# Patient Record
Sex: FEMALE | Race: WHITE | NOT HISPANIC OR LATINO | Employment: FULL TIME | URBAN - METROPOLITAN AREA
[De-identification: names, ages, dates, MRNs, and addresses within clinical notes are randomized per-mention and may not be internally consistent; named-entity substitution may affect disease eponyms.]

---

## 2017-01-25 ENCOUNTER — OFFICE VISIT (OUTPATIENT)
Dept: FAMILY MEDICINE | Facility: CLINIC | Age: 57
End: 2017-01-25
Payer: COMMERCIAL

## 2017-01-25 ENCOUNTER — HOSPITAL ENCOUNTER (OUTPATIENT)
Dept: RADIOLOGY | Facility: HOSPITAL | Age: 57
Discharge: HOME | End: 2017-01-25
Attending: FAMILY MEDICINE
Payer: COMMERCIAL

## 2017-01-25 ENCOUNTER — TELEPHONE (OUTPATIENT)
Dept: FAMILY MEDICINE | Facility: CLINIC | Age: 57
End: 2017-01-25

## 2017-01-25 VITALS
WEIGHT: 131.63 LBS | BODY MASS INDEX: 21.93 KG/M2 | HEART RATE: 79 BPM | DIASTOLIC BLOOD PRESSURE: 66 MMHG | SYSTOLIC BLOOD PRESSURE: 101 MMHG | TEMPERATURE: 98 F | HEIGHT: 65 IN

## 2017-01-25 DIAGNOSIS — R14.0 ABDOMINAL BLOATING: ICD-10-CM

## 2017-01-25 DIAGNOSIS — R10.13 ABDOMINAL PAIN, ACUTE, EPIGASTRIC: Primary | ICD-10-CM

## 2017-01-25 DIAGNOSIS — R10.13 EPIGASTRIC PAIN: Primary | ICD-10-CM

## 2017-01-25 DIAGNOSIS — R10.13 ABDOMINAL PAIN, ACUTE, EPIGASTRIC: ICD-10-CM

## 2017-01-25 PROCEDURE — 74176 CT ABD & PELVIS W/O CONTRAST: CPT | Mod: TC

## 2017-01-25 PROCEDURE — 99999 PR PBB SHADOW E&M-EST. PATIENT-LVL III: CPT | Mod: PBBFAC,,, | Performed by: PHYSICIAN ASSISTANT

## 2017-01-25 PROCEDURE — 25500020 PHARM REV CODE 255: Performed by: FAMILY MEDICINE

## 2017-01-25 PROCEDURE — 1159F MED LIST DOCD IN RCRD: CPT | Mod: S$GLB,,, | Performed by: PHYSICIAN ASSISTANT

## 2017-01-25 PROCEDURE — 74176 CT ABD & PELVIS W/O CONTRAST: CPT | Mod: 26,,, | Performed by: RADIOLOGY

## 2017-01-25 PROCEDURE — 99214 OFFICE O/P EST MOD 30 MIN: CPT | Mod: S$GLB,,, | Performed by: PHYSICIAN ASSISTANT

## 2017-01-25 RX ORDER — ESOMEPRAZOLE MAGNESIUM 40 MG/1
40 CAPSULE, DELAYED RELEASE ORAL
Qty: 30 CAPSULE | Refills: 2 | Status: SHIPPED | OUTPATIENT
Start: 2017-01-25 | End: 2017-03-02

## 2017-01-25 RX ADMIN — IOHEXOL 30 ML: 350 INJECTION, SOLUTION INTRAVENOUS at 11:01

## 2017-01-25 NOTE — PROGRESS NOTES
Subjective:       Patient ID: Joselyn Hurd is a 56 y.o. female.    Chief Complaint: Bloated (x 3 day) and Abdominal Pain (under the breast bone )    Abdominal Pain   This is a new problem. The current episode started in the past 7 days (2 days ago). The onset quality is undetermined. The problem occurs constantly. The problem has been gradually worsening. The pain is located in the epigastric region. The pain is at a severity of 8/10. The quality of the pain is burning. The abdominal pain radiates to the periumbilical region. Associated symptoms include nausea. Pertinent negatives include no anorexia, belching, constipation, diarrhea, dysuria, fever, frequency, hematochezia, hematuria, melena or vomiting. Nothing aggravates the pain. The pain is relieved by nothing. She has tried antacids for the symptoms. The treatment provided no relief. Her past medical history is significant for GERD and irritable bowel syndrome.     Review of Systems   Constitutional: Negative for appetite change, chills, diaphoresis, fatigue, fever and unexpected weight change.   Gastrointestinal: Positive for abdominal distention, abdominal pain and nausea. Negative for anal bleeding, anorexia, blood in stool, constipation, diarrhea, hematochezia, melena and vomiting.   Genitourinary: Negative for difficulty urinating, dysuria, flank pain, frequency and hematuria.   Musculoskeletal: Negative for back pain.   Skin: Negative for rash.   Neurological: Negative for dizziness and light-headedness.       Objective:      Physical Exam   Constitutional: She appears well-developed and well-nourished. She is cooperative. She appears distressed (holding abdomen ).   HENT:   Head: Normocephalic and atraumatic.   Cardiovascular: Normal rate, regular rhythm and normal heart sounds.    Pulmonary/Chest: Effort normal and breath sounds normal.   Abdominal: She exhibits distension. Bowel sounds are decreased. There is tenderness in the right upper quadrant,  right lower quadrant and epigastric area. There is guarding. There is no rigidity, no rebound, no CVA tenderness, no tenderness at McBurney's point and negative Ramos's sign.   Neurological: She is alert.   Vitals reviewed.      Assessment:       1. Abdominal pain, acute, epigastric    2. Abdominal bloating        Plan:       Joselyn was seen today for bloated and abdominal pain.    Diagnoses and all orders for this visit:    Abdominal pain, acute, epigastric  -   Abdominal bloating  -     STAT  CT Abdomen Pelvis  Without Contrast; Future  -     CBC auto differential; Future  -     Comprehensive metabolic panel; Future  -     Amylase; Future  -     Lipase; Future  Further recommendations will be made based on results     If CT and lab normal start PPI, upper gi and gi consult

## 2017-01-25 NOTE — TELEPHONE ENCOUNTER
Patient notified of results; voices understanding; established already with GI; will call , gastroenterologist for followup

## 2017-01-25 NOTE — TELEPHONE ENCOUNTER
Labs and CT show no acute abnormaltiy to account for her symptoms  There is constipation noted on CT   I would like to start a medication to reduce acid - rx sent into pharmacy and have her follow up with GI     Also recommend Miralax for constipation

## 2017-01-25 NOTE — TELEPHONE ENCOUNTER
----- Message from Jayshree Balbuena sent at 1/25/2017  3:07 PM CST -----  Contact: self  Patient is calling for LAB, Ct Scan results. Please call patient at [phone]. Thanks!

## 2017-01-25 NOTE — MR AVS SNAPSHOT
[facility]  [address] [place] [address]  Phone: [phone]  Fax: [phone]                  Joselyn Hurd   2017 8:00 AM   Office Visit    Description:  Female : 1960   Provider:  ELIZ Valladares   Department:  [facility]           Reason for Visit     Bloated     Abdominal Pain           Diagnoses this Visit        Comments    Abdominal pain, acute, epigastric    -  Primary     Abdominal bloating                To Do List           Future Appointments        Provider Department Dept Phone    2017 9:30 AM LAB, [facility] [phone]    2017 1:30 PM [facility] CT1 LIMIT 400 LBS [facility] [phone]    3/2/2017 3:40 PM Sebastián Thacker MD [facility] [phone]      Goals (5 Years of Data)     None      [facility]     [facility] Nurse Care Line -  Assistance  Registered nurses in the [facility] provide clinical advisement, health education, appointment booking, and other advisory services.  Call for this free service at [phone].             Medications           Message regarding Medications     Verify the changes and/or additions to your medication regime listed below are the same as discussed with your clinician today.  If any of these changes or additions are incorrect, please notify your healthcare provider.        STOP taking these medications     vitamin D (VITAMIN D3) 1000 units Tab Take 185 mg by mouth once daily.           Verify that the below list of medications is an accurate representation of the medications you are currently taking.  If none reported, the list may be blank. If incorrect, please contact your healthcare provider. Carry this list with you in case of emergency.           Current Medications     aspirin (ECOTRIN) 81 MG EC tablet Take 81 mg by mouth once daily.    ESTRING 2 mg vaginal ring Place vaginally once.   "   fluticasone (FLONASE) 50 mcg/actuation nasal spray 2 sprays by Each Nare route once daily.    multivitamin (THERAGRAN) per tablet Take 1 tablet by mouth daily as needed.     rizatriptan (MAXALT) 10 MG tablet TAKE 1 TABLET BY MOUTH AS NEEDED FOR MIGRAINE    trazodone (DESYREL) 50 MG tablet Take 1 tablet (50 mg total) by mouth every evening.           Clinical Reference Information           Vital Signs - Last Recorded  Most recent update: 1/25/2017  8:36 AM by Shaniqua Ramirez MA    BP Pulse Temp Ht Wt BMI    101/66 (BP Location: Left arm, Patient Position: Sitting, BP Method: Automatic) 79 97.8 °F (36.6 °C) (Oral) 5' 5" (1.651 m) 59.7 kg (131 lb 9.8 oz) 21.9 kg/m2      Blood Pressure          Most Recent Value    BP  101/66      Allergies as of 1/25/2017     Phenytoin Sodium Extended    Valacyclovir      Immunizations Administered on Date of Encounter - 1/25/2017     None      Orders Placed During Today's Visit     Future Labs/Procedures Expected by Expires    Amylase  1/25/2017 3/26/2018    CBC auto differential  1/25/2017 1/25/2018    Comprehensive metabolic panel  1/25/2017 3/26/2018    CT Abdomen Pelvis  Without Contrast  1/25/2017 1/25/2018    Lipase  1/25/2017 3/26/2018      [facility] Sign-Up     Activating your [facility] account is as easy as 1-2-3!     1) Visit [email or web address], select Sign Up Now, enter this activation code and your date of birth, then select Next.  [email or web address]  Expires: 3/11/2017  8:41 AM      2) Create a username and password to use when you visit [facility] in the future and select a security question in case you lose your password and select Next.    3) Enter your e-mail address and click Sign Up!    Additional Information  If you have questions, please e-mail [email or web address] or call [phone] to talk to our [facility] staff. Remember, [facility] is NOT to be used for urgent needs. For medical emergencies, dial 911.         "

## 2017-02-20 RX ORDER — RIZATRIPTAN BENZOATE 10 MG/1
TABLET ORAL
Qty: 9 TABLET | Refills: 2 | Status: SHIPPED | OUTPATIENT
Start: 2017-02-20 | End: 2017-05-01 | Stop reason: SDUPTHER

## 2017-02-20 RX ORDER — RIZATRIPTAN BENZOATE 10 MG/1
TABLET ORAL
Qty: 9 TABLET | Refills: 2 | Status: SHIPPED | OUTPATIENT
Start: 2017-02-20 | End: 2017-02-20 | Stop reason: SDUPTHER

## 2017-02-20 NOTE — TELEPHONE ENCOUNTER
----- Message from Madhuri Omalley LPN sent at 2/20/2017  1:33 PM CST -----  Contact: Iriada      ----- Message -----     From: Peace Cardoso     Sent: 2/20/2017  12:45 PM       To: Roya Gilliam Staff    Needs clarification on directions for Maxalt.  Please call back at .

## 2017-02-20 NOTE — TELEPHONE ENCOUNTER
----- Message from Lynette Rivera sent at 2/18/2017 11:01 AM CST -----  Contact: self [phone]  Patient is requesting a call back from the nurse stated her migraine medication for rizatriptan need to be re approved.  [facility] [address].    Please call the patient upon request at phone number [phone].

## 2017-02-21 ENCOUNTER — DOCUMENTATION ONLY (OUTPATIENT)
Dept: FAMILY MEDICINE | Facility: CLINIC | Age: 57
End: 2017-02-21

## 2017-02-21 NOTE — PROGRESS NOTES
Pre-Visit Chart Review  For Appointment Scheduled on 3-2-17    Health Maintenance Due   Topic Date Due    TETANUS VACCINE  12/18/1979    Colonoscopy  10/14/2016

## 2017-03-02 ENCOUNTER — OFFICE VISIT (OUTPATIENT)
Dept: FAMILY MEDICINE | Facility: CLINIC | Age: 57
End: 2017-03-02
Payer: COMMERCIAL

## 2017-03-02 VITALS
HEIGHT: 65 IN | RESPIRATION RATE: 16 BRPM | TEMPERATURE: 98 F | BODY MASS INDEX: 22.63 KG/M2 | DIASTOLIC BLOOD PRESSURE: 64 MMHG | SYSTOLIC BLOOD PRESSURE: 100 MMHG | HEART RATE: 86 BPM | WEIGHT: 135.81 LBS | OXYGEN SATURATION: 98 %

## 2017-03-02 DIAGNOSIS — G43.009 NONINTRACTABLE MIGRAINE, UNSPECIFIED MIGRAINE TYPE: ICD-10-CM

## 2017-03-02 DIAGNOSIS — Z00.00 ANNUAL PHYSICAL EXAM: Primary | ICD-10-CM

## 2017-03-02 DIAGNOSIS — R10.13 EPIGASTRIC PAIN: ICD-10-CM

## 2017-03-02 PROCEDURE — 99999 PR PBB SHADOW E&M-EST. PATIENT-LVL III: CPT | Mod: PBBFAC,,, | Performed by: FAMILY MEDICINE

## 2017-03-02 PROCEDURE — 99396 PREV VISIT EST AGE 40-64: CPT | Mod: S$GLB,,, | Performed by: FAMILY MEDICINE

## 2017-03-02 RX ORDER — DOCUSATE SODIUM 100 MG/1
100 CAPSULE, LIQUID FILLED ORAL DAILY PRN
COMMUNITY

## 2017-03-02 RX ORDER — UBIDECARENONE 75 MG
500 CAPSULE ORAL DAILY
COMMUNITY

## 2017-03-02 NOTE — PROGRESS NOTES
Subjective:       Patient ID: Joselyn Hurd is a 56 y.o. female.    Chief Complaint: Annual Exam    HPI Comments: 56 year old female in for annual exam.  She was seen a month ago for epigastric/RUQ abdominal pain worse with eating and not relieved by antacids.  It did not radiate and was not associated with activity.  Workup was negative including CT abdomen and lab and it has gradually subsided and is asymptomatic now.  She otherwise feels well with occasional migraines.      Past Medical History:  No date: GERD (gastroesophageal reflux disease)  No date: IBS (irritable bowel syndrome)  No date: Migraine headache    Past Surgical History:  10/14/11: COLONOSCOPY      Comment: Dr Ch 2 polyps removed, 5 year recheck  11/09/2016: COLONOSCOPY      Comment: Dr Ch 5 year swati   No date: DILATION AND CURETTAGE OF UTERUS  No date: TONSILLECTOMY  No date: WISDOM TOOTH EXTRACTION    Review of patient's family history indicates:    Hypertension                   Mother                    Kidney disease                 Mother                    Colon polyps                   Mother                    Heart disease                  Father                    Colon cancer                   Father                    Cancer                         Father                      Comment: colon/ leukemia     Hearing loss                   Father                    Cataracts                      Father                    Glaucoma                       Father                    Vision loss                    Father                    Kidney disease                 Father                    Hypertension                   Sister                    ADARSH disease                    Sister                    Asthma                         Sister                    Heart disease                  Sister                    Amblyopia                      Daughter                  Vitiligo                       Daughter                   Depression                     Daughter                  Hypertension                   Maternal Aunt             Stroke                         Maternal Aunt             Heart disease                  Maternal Uncle            Heart disease                  Maternal Grandmother      Dementia                       Maternal Grandmother      Alzheimer's disease            Maternal Grandmother      Cancer                         Maternal Grandfather        Comment: lung    Diabetes                       Brother                   No Known Problems              Paternal Grandmother      Cancer                         Paternal Grandfather      Heart disease                  Paternal Uncle            Cancer                         Maternal Aunt               Comment: lung, smoker    Social History    Marital status:              Spouse name:                       Years of education:                 Number of children:               Social History Main Topics    Smoking status: Never Smoker                                                                Smokeless status: Never Used                        Alcohol use: No              Drug use: No                Current Outpatient Prescriptions:     aspirin (ECOTRIN) 81 MG EC tablet, Take 81 mg by mouth once daily., Disp: , Rfl:     cyanocobalamin 500 MCG tablet, Take 500 mcg by mouth once daily. Vitamin D, Disp: , Rfl:     docusate sodium (COLACE) 100 MG capsule, Take 100 mg by mouth daily as needed for Constipation., Disp: , Rfl:     ESTRING 2 mg vaginal ring, Place vaginally once. , Disp: , Rfl:     fluticasone (FLONASE) 50 mcg/actuation nasal spray, 2 sprays by Each Nare route once daily., Disp: 16 g, Rfl: 11    multivitamin (THERAGRAN) per tablet, Take 1 tablet by mouth once daily. , Disp: , Rfl:     rizatriptan (MAXALT) 10 MG tablet, TAKE 1 TABLET BY MOUTH AS NEEDED FOR MIGRAINE, may repeat once in 24 hours, Disp: 9 tablet, Rfl: 2    trazodone (DESYREL) 50  MG tablet, Take 1 tablet (50 mg total) by mouth every evening. (Patient taking differently: Take 50 mg by mouth nightly as needed. ), Disp: 30 tablet, Rfl: 11    TETANUS VACCINE due on 12/18/1979-DECLINES      Review of Systems   Constitutional: Negative for chills, diaphoresis, fatigue, fever and unexpected weight change.   HENT: Negative for congestion, ear pain, hearing loss, postnasal drip, sinus pressure, sneezing, sore throat, tinnitus and trouble swallowing.    Eyes: Negative for itching and visual disturbance.   Respiratory: Negative for cough, chest tightness, shortness of breath and wheezing.    Cardiovascular: Negative for chest pain, palpitations and leg swelling.   Gastrointestinal: Positive for abdominal pain. Negative for blood in stool, constipation, diarrhea, nausea and vomiting.   Genitourinary: Negative for dysuria, frequency, hematuria, menstrual problem, pelvic pain, vaginal bleeding and vaginal discharge.   Musculoskeletal: Negative for arthralgias, back pain, joint swelling and myalgias.   Neurological: Negative for dizziness and headaches.   Hematological: Negative for adenopathy.   Psychiatric/Behavioral: Negative for sleep disturbance. The patient is not nervous/anxious.        Objective:      Physical Exam   Constitutional: She is oriented to person, place, and time. She appears well-developed and well-nourished. No distress.   Good blood pressure control  Patient is normal weight with bmi of 22.6.   Weight is increased by 3.9lb since last visit of 2/18/16.     HENT:   Head: Normocephalic and atraumatic.   Right Ear: External ear normal.   Left Ear: External ear normal.   Nose: Nose normal.   Mouth/Throat: Oropharynx is clear and moist. No oropharyngeal exudate.   Eyes: Conjunctivae and EOM are normal. Pupils are equal, round, and reactive to light. Right eye exhibits no discharge. Left eye exhibits no discharge. No scleral icterus.   Neck: Normal range of motion. Neck supple. No JVD  present. No tracheal deviation present. No thyromegaly present.   Cardiovascular: Normal rate, regular rhythm and normal heart sounds.  Exam reveals no gallop and no friction rub.    No murmur heard.  Pulmonary/Chest: Effort normal and breath sounds normal. No respiratory distress. She has no wheezes. She has no rales. She exhibits no tenderness.   Abdominal: Soft. Bowel sounds are normal. She exhibits no distension and no mass. There is no hepatosplenomegaly. There is no tenderness. There is no rebound, no guarding, no tenderness at McBurney's point and negative Ramos's sign. No hernia.   Musculoskeletal: Normal range of motion. She exhibits no edema, tenderness or deformity.   Lymphadenopathy:     She has no cervical adenopathy.   Neurological: She is alert and oriented to person, place, and time. She has normal reflexes. She displays normal reflexes. No cranial nerve deficit. She exhibits normal muscle tone.   Skin: Skin is warm and dry. No rash noted. She is not diaphoretic. No erythema.   Psychiatric: She has a normal mood and affect. Her behavior is normal. Judgment and thought content normal.   Nursing note and vitals reviewed.      Assessment:       1. Annual physical exam    2. Nonintractable migraine, unspecified migraine type    3. Epigastric pain        Plan:       1. Annual physical exam  Lab Results   Component Value Date    WBC 6.20 01/25/2017    HGB 13.1 01/25/2017    HCT 39.6 01/25/2017    MCV 86 01/25/2017     01/25/2017     CMP  Sodium   Date Value Ref Range Status   01/25/2017 144 136 - 145 mmol/L Final     Potassium   Date Value Ref Range Status   01/25/2017 4.3 3.5 - 5.1 mmol/L Final     Chloride   Date Value Ref Range Status   01/25/2017 107 95 - 110 mmol/L Final     CO2   Date Value Ref Range Status   01/25/2017 26 23 - 29 mmol/L Final     Glucose   Date Value Ref Range Status   01/25/2017 91 70 - 110 mg/dL Final     BUN, Bld   Date Value Ref Range Status   01/25/2017 18 6 - 20 mg/dL  Final     Creatinine   Date Value Ref Range Status   01/25/2017 0.9 0.5 - 1.4 mg/dL Final     Calcium   Date Value Ref Range Status   01/25/2017 9.7 8.7 - 10.5 mg/dL Final     Total Protein   Date Value Ref Range Status   01/25/2017 7.1 6.0 - 8.4 g/dL Final     Albumin   Date Value Ref Range Status   01/25/2017 4.4 3.5 - 5.2 g/dL Final     Total Bilirubin   Date Value Ref Range Status   01/25/2017 0.4 0.1 - 1.0 mg/dL Final     Comment:     For infants and newborns, interpretation of results should be based  on gestational age, weight and in agreement with clinical  observations.  Premature Infant recommended reference ranges:  Up to 24 hours.............<8.0 mg/dL  Up to 48 hours............<12.0 mg/dL  3-5 days..................<15.0 mg/dL  6-29 days.................<15.0 mg/dL       Alkaline Phosphatase   Date Value Ref Range Status   01/25/2017 75 55 - 135 U/L Final     AST   Date Value Ref Range Status   01/25/2017 16 10 - 40 U/L Final     ALT   Date Value Ref Range Status   01/25/2017 13 10 - 44 U/L Final     Anion Gap   Date Value Ref Range Status   01/25/2017 11 8 - 16 mmol/L Final     eGFR if    Date Value Ref Range Status   01/25/2017 >60 >60 mL/min/1.73 m^2 Final     eGFR if non    Date Value Ref Range Status   01/25/2017 >60 >60 mL/min/1.73 m^2 Final     Comment:     Calculation used to obtain the estimated glomerular filtration  rate (eGFR) is the CKD-EPI equation. Since race is unknown   in our information system, the eGFR values for   -American and Non--American patients are given   for each creatinine result.           2. Nonintractable migraine, unspecified migraine type  Asymptomatic currently  She feels its triggered by weather fronts moving through    3. Epigastric pain  resolved

## 2017-05-01 RX ORDER — RIZATRIPTAN BENZOATE 10 MG/1
TABLET ORAL
Qty: 9 TABLET | Refills: 11 | Status: SHIPPED | OUTPATIENT
Start: 2017-05-01 | End: 2018-05-22 | Stop reason: SDUPTHER

## 2017-05-01 RX ORDER — RIZATRIPTAN BENZOATE 10 MG/1
TABLET ORAL
Qty: 9 TABLET | Refills: 2 | OUTPATIENT
Start: 2017-05-01

## 2017-05-01 NOTE — TELEPHONE ENCOUNTER
----- Message from Obed Sanchez sent at 5/1/2017 12:01 PM CDT -----  Contact: pt  Pt is requesting a refill on her migraine medications  Call Back#[phone]  Thanks    [id number] - [place] - [address]  [address] [place] [address]  Phone: [phone] Fax: [phone]

## 2017-09-11 DIAGNOSIS — J06.9 URI, ACUTE: ICD-10-CM

## 2017-09-11 RX ORDER — FLUTICASONE PROPIONATE 50 MCG
SPRAY, SUSPENSION (ML) NASAL
Qty: 16 G | Refills: 2 | Status: SHIPPED | OUTPATIENT
Start: 2017-09-11 | End: 2018-06-22 | Stop reason: SDUPTHER

## 2018-02-21 ENCOUNTER — OFFICE VISIT (OUTPATIENT)
Dept: PHYSICAL MEDICINE AND REHAB | Facility: CLINIC | Age: 58
End: 2018-02-21
Payer: COMMERCIAL

## 2018-02-21 ENCOUNTER — HOSPITAL ENCOUNTER (OUTPATIENT)
Dept: RADIOLOGY | Facility: HOSPITAL | Age: 58
Discharge: HOME | End: 2018-02-21
Attending: PHYSICAL MEDICINE & REHABILITATION
Payer: COMMERCIAL

## 2018-02-21 VITALS
WEIGHT: 135.81 LBS | HEART RATE: 103 BPM | DIASTOLIC BLOOD PRESSURE: 72 MMHG | SYSTOLIC BLOOD PRESSURE: 105 MMHG | BODY MASS INDEX: 22.63 KG/M2 | HEIGHT: 65 IN

## 2018-02-21 DIAGNOSIS — M53.3 SACROILIAC DYSFUNCTION: Primary | ICD-10-CM

## 2018-02-21 DIAGNOSIS — M53.3 SACROILIAC DYSFUNCTION: ICD-10-CM

## 2018-02-21 PROCEDURE — 96372 THER/PROPH/DIAG INJ SC/IM: CPT | Mod: S$GLB,,, | Performed by: PHYSICAL MEDICINE & REHABILITATION

## 2018-02-21 PROCEDURE — 72202 X-RAY EXAM SI JOINTS 3/> VWS: CPT | Mod: 26,,, | Performed by: RADIOLOGY

## 2018-02-21 PROCEDURE — 99999 PR PBB SHADOW E&M-EST. PATIENT-LVL IV: CPT | Mod: PBBFAC,,, | Performed by: PHYSICAL MEDICINE & REHABILITATION

## 2018-02-21 PROCEDURE — 99204 OFFICE O/P NEW MOD 45 MIN: CPT | Mod: 25,S$GLB,, | Performed by: PHYSICAL MEDICINE & REHABILITATION

## 2018-02-21 PROCEDURE — 72202 X-RAY EXAM SI JOINTS 3/> VWS: CPT | Mod: TC,FY

## 2018-02-21 PROCEDURE — 3008F BODY MASS INDEX DOCD: CPT | Mod: S$GLB,,, | Performed by: PHYSICAL MEDICINE & REHABILITATION

## 2018-02-21 RX ORDER — BACLOFEN 10 MG/1
10 TABLET ORAL 2 TIMES DAILY
Qty: 60 TABLET | Refills: 6 | Status: SHIPPED | OUTPATIENT
Start: 2018-02-21 | End: 2023-04-20

## 2018-02-21 RX ORDER — KETOROLAC TROMETHAMINE 30 MG/ML
60 INJECTION, SOLUTION INTRAMUSCULAR; INTRAVENOUS ONCE
Status: COMPLETED | OUTPATIENT
Start: 2018-02-21 | End: 2018-02-21

## 2018-02-21 RX ADMIN — KETOROLAC TROMETHAMINE 60 MG: 30 INJECTION, SOLUTION INTRAMUSCULAR; INTRAVENOUS at 08:02

## 2018-02-21 NOTE — PATIENT INSTRUCTIONS
PRP Prep  -high protein diet-100g of protein a day- animal or vegetable  -stopping all nsaids and switching to Tumeric instead and/or tylenol  -adding 1g ofvit C and  1g of Magnesium  - a total of 3,000IU of vit D3 daily and a multivitamin

## 2018-02-21 NOTE — PROGRESS NOTES
HPI:  Patient is a 57 y.o. year old female w. Back pain radiating to right hip. It has been going on for 9 mos. It is throbbing  And involves sharp spasms. It is a 9/10 . She had 2 raza's which gave her relief for 3 mos. She is on nsaids which helps minimally. Pt. Had an mri of lspine, we do not have report but as per pt. She was diagnosed w. An annular tear from the imaging by her pain doc.    Labs  egfr cr lfts gluc nl      Past Medical History:   Diagnosis Date    GERD (gastroesophageal reflux disease)     IBS (irritable bowel syndrome)     Migraine headache      Past Surgical History:   Procedure Laterality Date    COLONOSCOPY  10/14/11    Dr Ch 2 polyps removed, 5 year recheck    COLONOSCOPY  11/09/2016    Dr Ch 5 year swati     DILATION AND CURETTAGE OF UTERUS      TONSILLECTOMY      WISDOM TOOTH EXTRACTION       Family History   Problem Relation Age of Onset    Hypertension Mother     Kidney disease Mother     Colon polyps Mother     Heart disease Father     Colon cancer Father     Cancer Father      colon/ leukemia     Hearing loss Father     Cataracts Father     Glaucoma Father     Vision loss Father     Kidney disease Father     Hypertension Sister     ADARSH disease Sister     Asthma Sister     Heart disease Sister     Amblyopia Daughter     Vitiligo Daughter     Depression Daughter     Hypertension Maternal Aunt     Stroke Maternal Aunt     Heart disease Maternal Uncle     Heart disease Maternal Grandmother     Dementia Maternal Grandmother     Alzheimer's disease Maternal Grandmother     Cancer Maternal Grandfather      lung    Diabetes Brother     No Known Problems Paternal Grandmother     Cancer Paternal Grandfather     Heart disease Paternal Uncle     Cancer Maternal Aunt      lung, smoker     Social History     Social History    Marital status:      Spouse name: N/A    Number of children: N/A    Years of education: N/A     Social History Main  Topics    Smoking status: Never Smoker    Smokeless tobacco: Never Used    Alcohol use No    Drug use: No    Sexual activity: Not on file     Other Topics Concern    Not on file     Social History Narrative    No narrative on file       Review of patient's allergies indicates:   Allergen Reactions    Phenytoin sodium extended      Other reaction(s): tachycardia    Valacyclovir      Other reaction(s): tachycardia       Current Outpatient Prescriptions:     aspirin (ECOTRIN) 81 MG EC tablet, Take 81 mg by mouth once daily., Disp: , Rfl:     cyanocobalamin 500 MCG tablet, Take 500 mcg by mouth once daily. Vitamin D, Disp: , Rfl:     docusate sodium (COLACE) 100 MG capsule, Take 100 mg by mouth daily as needed for Constipation., Disp: , Rfl:     ESTRING 2 mg vaginal ring, Place vaginally once. , Disp: , Rfl:     fluticasone (FLONASE) 50 mcg/actuation nasal spray, INSTILL 2 SPRAYS INTO EACH NOSTRIL ONCE DAILY, Disp: 16 g, Rfl: 2    multivitamin (THERAGRAN) per tablet, Take 1 tablet by mouth once daily. , Disp: , Rfl:     baclofen (LIORESAL) 10 MG tablet, Take 1 tablet (10 mg total) by mouth 2 (two) times daily., Disp: 60 tablet, Rfl: 6    rizatriptan (MAXALT) 10 MG tablet, TAKE ONE TABLET BY MOUTH AS NEEDED FOR MIGRAINE.MAX 2 TABS PER 24 HRS, Disp: 9 tablet, Rfl: 11    trazodone (DESYREL) 50 MG tablet, Take 1 tablet (50 mg total) by mouth every evening. (Patient taking differently: Take 50 mg by mouth nightly as needed. ), Disp: 30 tablet, Rfl: 11  No current facility-administered medications for this visit.       Review of Systems:    No nausea, vomiting, fevers, chills , contipation, diarrhea or sweats,no weight change, occ back stiffness, no chest pain, no sob, no change of bowel or bladder habits,no coordination issues        Physical Exam:      Vitals:    02/21/18 0820   BP: 105/72   Pulse: 103     alert and oriented ×4 follows commands answers all questions appropriately,affect wnl  Manual muscle  test 5 out of 5 sensation to light touch grossly intact  +mild tenderness right greater troch and sijt  Antalgic gait  -slR  -LOBO  Full hip ROM  babinsky down  No clonus  DTR's symmetric 2+  No C/C/E  Leg length discrepancy partially resolved after manual medicine    Assessment:  sijt dysfunction    Plan:  Greater than 50%of time spent reviewing diagnosis, prognosis and treatment- recommended prp to right sijt and gluteus medius  First she will get pelvic realignment-her  is a physical therapist and will do it at home    ADDENDUM  MRI LUMBAR SPINE  Dis 5/22/2017  PROMINENT 3 CM ANTERIOR ANNULAR FISSURE, ANNULAR DISC BULGE AT L2/L3  ANNULAR DISC BULGE AND MINOR FACET ARTHROPATHY L3/L4 ,L4/L5 AND L5/S1

## 2018-03-16 ENCOUNTER — CLINICAL SUPPORT (OUTPATIENT)
Dept: PHYSICAL MEDICINE AND REHAB | Facility: CLINIC | Age: 58
End: 2018-03-16
Payer: COMMERCIAL

## 2018-03-16 VITALS
DIASTOLIC BLOOD PRESSURE: 76 MMHG | WEIGHT: 135 LBS | BODY MASS INDEX: 22.49 KG/M2 | HEIGHT: 65 IN | SYSTOLIC BLOOD PRESSURE: 138 MMHG | HEART RATE: 94 BPM

## 2018-03-16 DIAGNOSIS — M53.3 SACROILIAC DYSFUNCTION: Primary | ICD-10-CM

## 2018-03-16 PROCEDURE — 99499 UNLISTED E&M SERVICE: CPT | Mod: S$GLB,,, | Performed by: PHYSICAL MEDICINE & REHABILITATION

## 2018-03-16 PROCEDURE — 99999 PR PBB SHADOW E&M-EST. PATIENT-LVL III: CPT | Mod: PBBFAC,,, | Performed by: PHYSICAL MEDICINE & REHABILITATION

## 2018-03-16 PROCEDURE — 0232T NJX PLATELET PLASMA: CPT | Mod: CSM,S$GLB,, | Performed by: PHYSICAL MEDICINE & REHABILITATION

## 2018-03-16 RX ORDER — TRAMADOL HYDROCHLORIDE 50 MG/1
50 TABLET ORAL EVERY 6 HOURS PRN
Qty: 28 TABLET | Refills: 0 | Status: SHIPPED | OUTPATIENT
Start: 2018-03-16 | End: 2018-03-23

## 2018-03-16 NOTE — PROGRESS NOTES
ROCEDURE NOTE: SIJT PRP    Risks ,benefits and alternatives to this procedure were discussed. Verbal consent was obtained. The patient was sterile prepped and 60 ml of whole blood were withdrawn and harvested from the patient using a standard technique via antecubital access of the left arm. This blood was processed on location in an [employer] PRP system processing machine and approximately 4 cc of PRP was yielded. The patient was placed in a PRONE position. The patient was prepped in the usual sterile manner using betadyne and chlorohexedine solution. Pt. Was anesthesized with 1ml of 1% lidocaine at the injection site and also sprayed with by ethyl chloride spray to the area. Utrasound guidance was utilized.     Risk and benefit of  US guided SIJT  Ligament PRP given to pt. Spinal needle 22 g spinal needle  utilized. MEDIAL to  lateral approach. Injection performed after sterile prep w.chlorohexedine,      Ultrasound interpretation was performed prior to the procedure to identify the target and any adjacent neurovascular structures.  Subsequently, interpretation was performed during real- time needle guidance confirming placement. Post- intervention interpretation was also performed confirming appropriate injectate flow and hemostasis.  Images indicating needle placement have been saved in [facility].    There were no complications. A sterile dressing was applied to the area.       Plan: The patient was given instructions to avoid the use of any NSAIDs for the next 2 weeks, she may use Tylenol +/- tramadol 50 mg take every 6 hours as needed for pain. She may also wear an sijt belt first 2 weeks.

## 2018-03-17 ENCOUNTER — NURSE TRIAGE (OUTPATIENT)
Dept: ADMINISTRATIVE | Facility: CLINIC | Age: 58
End: 2018-03-17

## 2018-03-17 NOTE — TELEPHONE ENCOUNTER
"    Reason for Disposition   Taking prescription medication that could cause itching (e.g., codeine/morphine/other opiates, aspirin)    Answer Assessment - Initial Assessment Questions  1. DESCRIPTION: "Describe the itching you are having."       itching, all over her body, worse last night, better this am  2. SEVERITY: "How bad is it?"     - MILD - doesn't interfere with normal activities    - MODERATE-SEVERE: interferes with work, school, sleep, or other activities       mild  3. SCRATCHING: "Are there any scratch marks? Bleeding?"     Scratch marks  4. ONSET: "When did this begin?"       Last night  5. CAUSE: "What do you think is causing the itching?" (ask about swimming pools, pollen, animals, soaps, etc.)      Tramadol   6. OTHER SYMPTOMS: "Do you have any other symptoms?"       none  7. PREGNANCY: "Is there any chance you are pregnant?" "When was your last menstrual period?"      no    Protocols used: ST ITCHING - WIDESPREAD AND CAUSE UNKNOWN-A-AH      "

## 2018-04-13 ENCOUNTER — OFFICE VISIT (OUTPATIENT)
Dept: PHYSICAL MEDICINE AND REHAB | Facility: CLINIC | Age: 58
End: 2018-04-13
Payer: COMMERCIAL

## 2018-04-13 VITALS
HEART RATE: 88 BPM | BODY MASS INDEX: 22.49 KG/M2 | HEIGHT: 65 IN | SYSTOLIC BLOOD PRESSURE: 106 MMHG | WEIGHT: 135 LBS | DIASTOLIC BLOOD PRESSURE: 71 MMHG

## 2018-04-13 DIAGNOSIS — G89.29 CHRONIC LEFT-SIDED LOW BACK PAIN WITHOUT SCIATICA: ICD-10-CM

## 2018-04-13 DIAGNOSIS — M54.50 CHRONIC LEFT-SIDED LOW BACK PAIN WITHOUT SCIATICA: ICD-10-CM

## 2018-04-13 DIAGNOSIS — M79.10 MYALGIA: ICD-10-CM

## 2018-04-13 PROCEDURE — 99213 OFFICE O/P EST LOW 20 MIN: CPT | Mod: 25,S$GLB,, | Performed by: PHYSICAL MEDICINE & REHABILITATION

## 2018-04-13 PROCEDURE — 99999 PR PBB SHADOW E&M-EST. PATIENT-LVL III: CPT | Mod: PBBFAC,,, | Performed by: PHYSICAL MEDICINE & REHABILITATION

## 2018-04-13 PROCEDURE — 20552 NJX 1/MLT TRIGGER POINT 1/2: CPT | Mod: S$GLB,,, | Performed by: PHYSICAL MEDICINE & REHABILITATION

## 2018-04-13 NOTE — PROGRESS NOTES
HPI:  Patient is a 57 y.o. year old female w. Back pain s/p prp to right sijt. This pain has completely resolved. She does report discomfort on the left side but not as severe as the right.        Past Medical History:   Diagnosis Date    GERD (gastroesophageal reflux disease)     IBS (irritable bowel syndrome)     Migraine headache      Past Surgical History:   Procedure Laterality Date    COLONOSCOPY  10/14/11    Dr Ch 2 polyps removed, 5 year recheck    COLONOSCOPY  11/09/2016    Dr Ch 5 year swati     DILATION AND CURETTAGE OF UTERUS      TONSILLECTOMY      WISDOM TOOTH EXTRACTION       Family History   Problem Relation Age of Onset    Hypertension Mother     Kidney disease Mother     Colon polyps Mother     Heart disease Father     Colon cancer Father     Cancer Father      colon/ leukemia     Hearing loss Father     Cataracts Father     Glaucoma Father     Vision loss Father     Kidney disease Father     Hypertension Sister     ADARSH disease Sister     Asthma Sister     Heart disease Sister     Amblyopia Daughter     Vitiligo Daughter     Depression Daughter     Hypertension Maternal Aunt     Stroke Maternal Aunt     Heart disease Maternal Uncle     Heart disease Maternal Grandmother     Dementia Maternal Grandmother     Alzheimer's disease Maternal Grandmother     Cancer Maternal Grandfather      lung    Diabetes Brother     No Known Problems Paternal Grandmother     Cancer Paternal Grandfather     Heart disease Paternal Uncle     Cancer Maternal Aunt      lung, smoker     Social History     Social History    Marital status:      Spouse name: N/A    Number of children: N/A    Years of education: N/A     Social History Main Topics    Smoking status: Never Smoker    Smokeless tobacco: Never Used    Alcohol use No    Drug use: No    Sexual activity: Not on file     Other Topics Concern    Not on file     Social History Narrative    No narrative on  file       Review of patient's allergies indicates:   Allergen Reactions    Phenytoin sodium extended      Other reaction(s): tachycardia    Tramadol Hives    Valacyclovir      Other reaction(s): tachycardia       Current Outpatient Prescriptions:     aspirin (ECOTRIN) 81 MG EC tablet, Take 81 mg by mouth once daily., Disp: , Rfl:     cyanocobalamin 500 MCG tablet, Take 500 mcg by mouth once daily. Vitamin D, Disp: , Rfl:     docusate sodium (COLACE) 100 MG capsule, Take 100 mg by mouth daily as needed for Constipation., Disp: , Rfl:     ESTRING 2 mg vaginal ring, Place vaginally once. , Disp: , Rfl:     fluticasone (FLONASE) 50 mcg/actuation nasal spray, INSTILL 2 SPRAYS INTO EACH NOSTRIL ONCE DAILY, Disp: 16 g, Rfl: 2    multivitamin (THERAGRAN) per tablet, Take 1 tablet by mouth once daily. , Disp: , Rfl:     rizatriptan (MAXALT) 10 MG tablet, TAKE ONE TABLET BY MOUTH AS NEEDED FOR MIGRAINE.MAX 2 TABS PER 24 HRS, Disp: 9 tablet, Rfl: 11    baclofen (LIORESAL) 10 MG tablet, Take 1 tablet (10 mg total) by mouth 2 (two) times daily., Disp: 60 tablet, Rfl: 6    trazodone (DESYREL) 50 MG tablet, Take 1 tablet (50 mg total) by mouth every evening. (Patient taking differently: Take 50 mg by mouth nightly as needed. ), Disp: 30 tablet, Rfl: 11        Review of Systems  No nausea, vomiting, fevers, Chills , contipation, diarrhea or sweats    Physical Exam:      Vitals:    04/13/18 0800   BP: 106/71   Pulse: 88       alert and oriented ×4 follows commands answers all questions appropriately,affect wnl  Manual muscle test 5 out of 5 sensation to light touch grossly intact  +mild tenderness L QL  Good psis mobility  Levelled iliac crests  No C/C/E    Assessment:  sijt dysfunction much improved  Recurrent left lumbar musc spasm  Plan:  tpi's to low back left side today  May need to get prp on the left sijt in future  May begin strengthening of pelvic stabilizers (her  is a physical therapist)      pROCEDURE  "NOTE:  Risk and benefit of trigger point injections given to pt. Injections performed w. A 1.5" 25G needle after sterile prep w. Betadine, verbal consent obtained . NO complications. Left Quadratus Lumborum  AND ILIOCOSTALIS were inJected with a total of 3ML of 1% Lidocaine          "

## 2018-05-22 RX ORDER — RIZATRIPTAN BENZOATE 10 MG/1
TABLET ORAL
Qty: 9 TABLET | Refills: 10 | Status: SHIPPED | OUTPATIENT
Start: 2018-05-22 | End: 2019-07-02 | Stop reason: SDUPTHER

## 2018-06-22 DIAGNOSIS — J06.9 URI, ACUTE: ICD-10-CM

## 2018-06-22 RX ORDER — FLUTICASONE PROPIONATE 50 MCG
SPRAY, SUSPENSION (ML) NASAL
Qty: 16 ML | Refills: 10 | Status: SHIPPED | OUTPATIENT
Start: 2018-06-22 | End: 2019-07-02 | Stop reason: SDUPTHER

## 2019-04-24 LAB — HUMAN PAPILLOMAVIRUS (HPV): NORMAL

## 2019-06-03 ENCOUNTER — OFFICE VISIT (OUTPATIENT)
Dept: PODIATRY | Facility: CLINIC | Age: 59
End: 2019-06-03
Payer: COMMERCIAL

## 2019-06-03 VITALS — HEIGHT: 65 IN | BODY MASS INDEX: 22.49 KG/M2 | WEIGHT: 135 LBS | RESPIRATION RATE: 19 BRPM

## 2019-06-03 DIAGNOSIS — M20.5X2 HALLUX LIMITUS, LEFT: ICD-10-CM

## 2019-06-03 DIAGNOSIS — M20.12 HALLUX VALGUS, LEFT: ICD-10-CM

## 2019-06-03 DIAGNOSIS — M79.672 LEFT FOOT PAIN: Primary | ICD-10-CM

## 2019-06-03 PROCEDURE — 99203 OFFICE O/P NEW LOW 30 MIN: CPT | Mod: 25,,, | Performed by: PODIATRIST

## 2019-06-03 PROCEDURE — 73630 PR  X-RAY FOOT 3+ VW: ICD-10-PCS | Mod: LT,,, | Performed by: PODIATRIST

## 2019-06-03 PROCEDURE — 3008F BODY MASS INDEX DOCD: CPT | Mod: ,,, | Performed by: PODIATRIST

## 2019-06-03 PROCEDURE — 3008F PR BODY MASS INDEX (BMI) DOCUMENTED: ICD-10-PCS | Mod: ,,, | Performed by: PODIATRIST

## 2019-06-03 PROCEDURE — 99203 PR OFFICE/OUTPT VISIT, NEW, LEVL III, 30-44 MIN: ICD-10-PCS | Mod: 25,,, | Performed by: PODIATRIST

## 2019-06-03 PROCEDURE — 73630 X-RAY EXAM OF FOOT: CPT | Mod: LT,,, | Performed by: PODIATRIST

## 2019-06-03 NOTE — PROGRESS NOTES
[address] [facility] [address]  [place] [address]  Phone: [phone]   [phone]    Patient's PCP:Sebastián Thacker MD  Referring Provider: No ref. provider found    Subjective:      Chief Complaint:: Foot Pain (bunion pain left foot)    HPI  Joselyn Hurd is a 58 y.o. female who presents with a complaint of left foot pain (bunion) lasting for months. Onset of the symptoms was unkown, pt thinks pain is a bunion.  Current symptoms include pain and aching.  Aggravating factors are shoes other than running shoes. Symptoms have gotten worse in the last six months.Treatment to date have included running shoes and ibuporfen. Patients rates pain 9/10 on pain scale.        Vitals:    06/03/19 1356   Resp: 19     Shoe Size: 9    Past Surgical History:   Procedure Laterality Date    COLONOSCOPY  10/14/11    Dr Ch 2 polyps removed, 5 year recheck    COLONOSCOPY  11/09/2016    Dr Ch 5 year swati     DILATION AND CURETTAGE OF UTERUS      TONSILLECTOMY      WISDOM TOOTH EXTRACTION       Past Medical History:   Diagnosis Date    GERD (gastroesophageal reflux disease)     IBS (irritable bowel syndrome)     Migraine headache      Family History   Problem Relation Age of Onset    Hypertension Mother     Kidney disease Mother     Colon polyps Mother     Heart disease Father     Colon cancer Father     Cancer Father         colon/ leukemia     Hearing loss Father     Cataracts Father     Glaucoma Father     Vision loss Father     Kidney disease Father     Hypertension Sister     ADARSH disease Sister     Asthma Sister     Heart disease Sister     Amblyopia Daughter     Vitiligo Daughter     Depression Daughter     Hypertension Maternal Aunt     Stroke Maternal Aunt     Heart disease Maternal Uncle     Heart disease Maternal Grandmother     Dementia Maternal Grandmother     Alzheimer's disease Maternal Grandmother     Cancer Maternal Grandfather         lung    Diabetes Brother     No  Known Problems Paternal Grandmother     Cancer Paternal Grandfather     Heart disease Paternal Uncle     Cancer Maternal Aunt         lung, smoker        Social History:   Marital Status:   Alcohol History:  reports that she does not drink alcohol.  Tobacco History:  reports that she has never smoked. She has never used smokeless tobacco.  Drug History:  reports that she does not use drugs.    Review of patient's allergies indicates:   Allergen Reactions    Phenytoin sodium extended      Other reaction(s): tachycardia    Tramadol Hives    Valacyclovir      Other reaction(s): tachycardia       Current Outpatient Medications   Medication Sig Dispense Refill    aspirin (ECOTRIN) 81 MG EC tablet Take 81 mg by mouth once daily.      cyanocobalamin 500 MCG tablet Take 500 mcg by mouth once daily. Vitamin D      docusate sodium (COLACE) 100 MG capsule Take 100 mg by mouth daily as needed for Constipation.      ESTRING 2 mg vaginal ring Place vaginally once.       fluticasone (FLONASE) 50 mcg/actuation nasal spray INSTILL 2 SPRAYS INTO EACH NOSTRIL ONCE DAILY 16 mL 10    multivitamin (THERAGRAN) per tablet Take 1 tablet by mouth once daily.       rizatriptan (MAXALT) 10 MG tablet TAKE ONE TABLET BY MOUTH AS NEEDED FOR MIGRAINE.MAX 2 TABS PER 24 HRS 9 tablet 10    baclofen (LIORESAL) 10 MG tablet Take 1 tablet (10 mg total) by mouth 2 (two) times daily. 60 tablet 6    trazodone (DESYREL) 50 MG tablet Take 1 tablet (50 mg total) by mouth every evening. (Patient taking differently: Take 50 mg by mouth nightly as needed. ) 30 tablet 11     No current facility-administered medications for this visit.        Review of Systems      Objective:        Physical Exam:   Foot Exam    General  General Appearance: appears stated age and healthy   Orientation: alert and oriented to person, place, and time   Affect: appropriate   Gait: antalgic       Left Foot/Ankle      Inspection and Palpation  Ecchymosis:  none  Tenderness: great toe metatarsophalangeal joint   Swelling: great toe metatarsophalangeal joint   Arch: normal  Hammertoes: absent  Hallux valgus: yes  Hallux limitus: yes    Neurovascular  Dorsalis pedis: 2+  Posterior tibial: 2+  Saphenous nerve sensation: normal  Tibial nerve sensation: normal  Superficial peroneal nerve sensation: normal  Deep peroneal nerve sensation: normal  Sural nerve sensation: normal    Comments  Hallux valgus left foot that is semi- reducible with tracking laterally with pain to palpation of visible and palpable prominence of bone dorsomedially at 1st metatarsal head.  Range of motion is limited in dorsiflexion with deep joint pain with range of motion, mild crepitus and is worse while weight bearing  and with closed shoes.    Physical Exam   Cardiovascular:   Pulses:       Dorsalis pedis pulses are 2+ on the left side.        Posterior tibial pulses are 2+ on the left side.       Imaging:   X-Ray Foot Complete Left  No acute fractures, no bone tumors, no soft tissue masses. Early stage III hallux limitus, mild hallux valgus with a large medial hyperostosis and dorsal exostosis. Mild increase in intermetatarsal angle. Mild increase in hallux valgus angle.  Inferior and posterior calcaneal exostosis, normal  of foot           Assessment:       1. Left foot pain    2. Hallux valgus, left    3. Hallux limitus, left      Plan:   Left foot pain    Hallux valgus, left    Hallux limitus, left    The patient will try shoe modification with more toebox space, topical anti-inflammatory cream, ice or heat and range of motion. Recommend nonimpact exercise and consider cortisone injection. If pain continues or return for further evaluation other options of treatment.  No follow-ups on file.    Procedures - None    Counseling:   I provided patient education regarding: Bunion deformity and causes. I discussed conservative treatment with shoe modification, pads, treating symptoms with OTC  NSAIDs, pads between toes, inserts and pads over the bunion. I explained that conservative treatment is non-curative but may help with pain and slow down the progression of the deformity.     I explained to the pt the 4 stages of osteoarthritic changes of the 1st MPJ starting with functional loss of range of motion and eventual destruction of the cartilage causing increased deformity, pain and loss of motion.  I discuss the treatment of the early stages 1 and 2 with shoe modification, NSAIDs, possible cortisone shots and CMOs.  I told her most stage 3 and 4 if they become painful enough may require surgical intervention.  I discussed joint preservation procedure with partial relief of pain and some increase in function and possible second surgery in the future if pain and arthritis become worse.  I discussed joint destruction procedures of fusion o 1st MPJ, Del Valle bunionectomy and possible joint implant if the pt. meets my criteria of older age and sedentary life style.  I provided patient education verbally regarding:   Patient diagnosis, treatment options, as well as alternatives, risks, and benefits.     This note was created using Dragon voice recognition software that occasionally misinterpreted phrases or words.

## 2019-06-03 NOTE — PATIENT INSTRUCTIONS
Understanding Bunions    A bunion is a bony bump on the joint at the base of the big toe. A bunion changes the shape of the foot. It can also cause pain and problems using the foot.  A person with a bunion will have a bony bump at the base of the big toe. This is caused by misalignment of the toe joint, causing the bones to sit at an angle instead of straight. The big toe often turns in toward the second toe. In time, the big toe may start to overlap the second toe.    What causes bunions?  It is not clear what causes bunions, but many factors are likely involved. Bunions often run in families. They may be more common in people who have loose joints. Wearing tight shoes may also cause bunions.  Symptoms of bunions  At first, the problem may be painless. As symptoms develop, they may include:  · Foot pain or stiffness  · Swelling over the joint  · Skin irritation or thickened skin over the joint  Treatment for bunions  In most cases, your healthcare provider will try nonsurgical treatments first. Most of these treatments wont cure the bunion, but they can help relieve symptoms and keep the bunion from getting worse. These include:  · Wearing low-heeled shoes with a wide toe box. This can help relieve pressure on the bunion and make walking more comfortable.  · Using padding or special shoe inserts called orthotics. Inserts can be custom-made for your foot. They help support the foot and may change how the foot is aligned.  · Wearing a toe splint at night. This can help hold the toe in a straighter position.  · Putting an ice pack on a swollen joint. This can help reduce pain and swelling.  If the bunion causes severe pain or problems using the foot, your provider may recommend surgery. During surgery, excess bone may be removed and the joint is realigned.     When to call your healthcare provider  Call your healthcare provider right away if you have any of these:  · Fever of 100.4°F (38°C) or higher, or as  directed  · Symptoms that dont get better, or get worse  · New symptoms   Date Last Reviewed: 3/10/2016  © 6741-0925 [employer]. [address] [place] [address], [place] [address]. All rights reserved. This information is not intended as a substitute for professional medical care. Always follow your healthcare professional's instructions.

## 2019-06-20 ENCOUNTER — PATIENT OUTREACH (OUTPATIENT)
Dept: ADMINISTRATIVE | Facility: HOSPITAL | Age: 59
End: 2019-06-20

## 2019-06-20 DIAGNOSIS — Z12.31 SCREENING MAMMOGRAM, ENCOUNTER FOR: Primary | ICD-10-CM

## 2019-06-20 NOTE — LETTER
June 20, 2019    Joselyn JUSTIN Hurd  [address]  [place] [address]             [facility]  [address]  [facility] [address]  Phone: [phone] [facility] is committed to your overall health.  To help you get the most out of each of your visits, we will review your information to make sure you are up to date on all of your recommended tests and/or procedures.      Dr. Sebastián Thacker MD has found that your chart shows you may be due for a:    MAMMOGRAM     If you have had any of the above done at another facility, please bring the records or information with you so that your record at [facility] will be complete.  If you would like to schedule any of these, please contact the clinic at [phone].    If you are currently taking medication, please bring it with you to your appointment for review.    Also, if you have any type of Advanced Directives, please bring them with you to your office visit so we may scan them into your chart.    Thank You,    Your [facility] Team,  MD Tegan Mulligan LPN Clinical Care Coordinator  [facility]  [address] [place] [address]  Phone [phone]  Fax [phone]

## 2019-06-20 NOTE — PROGRESS NOTES
Health Maintenance Due   Topic Date Due    TETANUS VACCINE  12/18/1978    Mammogram  06/02/2019

## 2019-06-21 ENCOUNTER — DOCUMENTATION ONLY (OUTPATIENT)
Dept: FAMILY MEDICINE | Facility: CLINIC | Age: 59
End: 2019-06-21

## 2019-06-21 NOTE — PROGRESS NOTES
Pre-Visit Chart Review  For Appointment Scheduled on 7-2-19    Health Maintenance Due   Topic Date Due    TETANUS VACCINE  12/18/1979    Mammogram  06/02/2019

## 2019-07-02 ENCOUNTER — OFFICE VISIT (OUTPATIENT)
Dept: FAMILY MEDICINE | Facility: CLINIC | Age: 59
End: 2019-07-02
Attending: FAMILY MEDICINE
Payer: COMMERCIAL

## 2019-07-02 VITALS
TEMPERATURE: 98 F | HEIGHT: 65 IN | WEIGHT: 141.31 LBS | DIASTOLIC BLOOD PRESSURE: 62 MMHG | SYSTOLIC BLOOD PRESSURE: 108 MMHG | HEART RATE: 79 BPM | BODY MASS INDEX: 23.54 KG/M2 | OXYGEN SATURATION: 98 %

## 2019-07-02 DIAGNOSIS — J31.0 CHRONIC RHINITIS: ICD-10-CM

## 2019-07-02 DIAGNOSIS — S61.012A LACERATION OF LEFT THUMB WITHOUT FOREIGN BODY WITHOUT DAMAGE TO NAIL, INITIAL ENCOUNTER: ICD-10-CM

## 2019-07-02 DIAGNOSIS — K58.9 IRRITABLE BOWEL SYNDROME, UNSPECIFIED TYPE: ICD-10-CM

## 2019-07-02 DIAGNOSIS — G43.909 MIGRAINE WITHOUT STATUS MIGRAINOSUS, NOT INTRACTABLE, UNSPECIFIED MIGRAINE TYPE: ICD-10-CM

## 2019-07-02 DIAGNOSIS — K21.9 GASTROESOPHAGEAL REFLUX DISEASE, ESOPHAGITIS PRESENCE NOT SPECIFIED: ICD-10-CM

## 2019-07-02 DIAGNOSIS — Z00.00 ENCOUNTER FOR PREVENTIVE HEALTH EXAMINATION: Primary | ICD-10-CM

## 2019-07-02 PROCEDURE — 99396 PR PREVENTIVE VISIT,EST,40-64: ICD-10-PCS | Mod: 25,S$GLB,, | Performed by: FAMILY MEDICINE

## 2019-07-02 PROCEDURE — 90714 TD VACC NO PRESV 7 YRS+ IM: CPT | Mod: S$GLB,,, | Performed by: FAMILY MEDICINE

## 2019-07-02 PROCEDURE — 90714 TD VACCINE GREATER THAN OR EQUAL TO 7YO PRESERVATIVE FREE IM: ICD-10-PCS | Mod: S$GLB,,, | Performed by: FAMILY MEDICINE

## 2019-07-02 PROCEDURE — 90471 TD VACCINE GREATER THAN OR EQUAL TO 7YO PRESERVATIVE FREE IM: ICD-10-PCS | Mod: S$GLB,,, | Performed by: FAMILY MEDICINE

## 2019-07-02 PROCEDURE — 99999 PR PBB SHADOW E&M-EST. PATIENT-LVL III: ICD-10-PCS | Mod: PBBFAC,,, | Performed by: FAMILY MEDICINE

## 2019-07-02 PROCEDURE — 99396 PREV VISIT EST AGE 40-64: CPT | Mod: 25,S$GLB,, | Performed by: FAMILY MEDICINE

## 2019-07-02 PROCEDURE — 90471 IMMUNIZATION ADMIN: CPT | Mod: S$GLB,,, | Performed by: FAMILY MEDICINE

## 2019-07-02 PROCEDURE — 99999 PR PBB SHADOW E&M-EST. PATIENT-LVL III: CPT | Mod: PBBFAC,,, | Performed by: FAMILY MEDICINE

## 2019-07-02 RX ORDER — PROGESTERONE 200 MG/1
CAPSULE ORAL
COMMUNITY
Start: 2019-07-01

## 2019-07-02 RX ORDER — RIZATRIPTAN BENZOATE 10 MG/1
TABLET ORAL
Qty: 9 TABLET | Refills: 10 | Status: SHIPPED | OUTPATIENT
Start: 2019-07-02 | End: 2020-07-15

## 2019-07-02 RX ORDER — FLUTICASONE PROPIONATE 50 MCG
2 SPRAY, SUSPENSION (ML) NASAL DAILY
Qty: 16 ML | Refills: 10 | Status: SHIPPED | OUTPATIENT
Start: 2019-07-02 | End: 2020-07-20

## 2019-07-02 NOTE — PROGRESS NOTES
Subjective:       Patient ID: Joselyn Hurd is a 58 y.o. female.    Chief Complaint: Annual Exam    58-year-old female coming in for physical exam.  She is not fasting at this time.  She has several concerns.  She needs refills of several of her medications.  She had a fall a few days ago and sustained some minor lacerations of her left thumb and her last tetanus is more than 10 years ago.  She would like to tetanus shot.  She has a family history of heart disease in both parents, her father was a smoker.  She is concerned about prevention.  She has no symptoms at this time.  She has a history of reflux, irritable bowel syndrome, and migraines none of which are active at this moment.  She has very minimal bunions and has been recommended to have bunion surgery by a local podiatrist.  Her only symptoms involve arthritic pain at the 1st MP joint on the right foot and there is no evidence that bunion surgery would correct this and in fact may make it worse.  She recently had her mammogram and gyn checkup and everything was satisfactory.  She had the zoster vaccine and has questions about whether she should get the new shingles vaccine or not.    Past Medical History:  No date: GERD (gastroesophageal reflux disease)  No date: IBS (irritable bowel syndrome)  No date: Migraine headache    Past Surgical History:  10/14/11: COLONOSCOPY      Comment:  Dr Ch 2 polyps removed, 5 year recheck  11/09/2016: COLONOSCOPY      Comment:  Dr Ch 5 year swati   No date: DILATION AND CURETTAGE OF UTERUS  No date: TONSILLECTOMY  No date: WISDOM TOOTH EXTRACTION    Review of patient's family history indicates:  Problem: Hypertension      Relation: Mother          Age of Onset: (Not Specified)  Problem: Kidney disease      Relation: Mother          Age of Onset: (Not Specified)  Problem: Colon polyps      Relation: Mother          Age of Onset: (Not Specified)  Problem: Heart disease      Relation: Father          Age of Onset:  (Not Specified)  Problem: Colon cancer      Relation: Father          Age of Onset: (Not Specified)  Problem: Cancer      Relation: Father          Age of Onset: (Not Specified)          Comment: colon/ leukemia   Problem: Hearing loss      Relation: Father          Age of Onset: (Not Specified)  Problem: Cataracts      Relation: Father          Age of Onset: (Not Specified)  Problem: Glaucoma      Relation: Father          Age of Onset: (Not Specified)  Problem: Vision loss      Relation: Father          Age of Onset: (Not Specified)  Problem: Kidney disease      Relation: Father          Age of Onset: (Not Specified)  Problem: Hypertension      Relation: Sister          Age of Onset: (Not Specified)  Problem: ADARSH disease      Relation: Sister          Age of Onset: (Not Specified)  Problem: Asthma      Relation: Sister          Age of Onset: (Not Specified)  Problem: Heart disease      Relation: Sister          Age of Onset: (Not Specified)  Problem: Amblyopia      Relation: Daughter          Age of Onset: (Not Specified)  Problem: Vitiligo      Relation: Daughter          Age of Onset: (Not Specified)  Problem: Depression      Relation: Daughter          Age of Onset: (Not Specified)  Problem: Hypertension      Relation: Maternal Aunt          Age of Onset: (Not Specified)  Problem: Stroke      Relation: Maternal Aunt          Age of Onset: (Not Specified)  Problem: Heart disease      Relation: Maternal Uncle          Age of Onset: (Not Specified)  Problem: Heart disease      Relation: Maternal Grandmother          Age of Onset: (Not Specified)  Problem: Dementia      Relation: Maternal Grandmother          Age of Onset: (Not Specified)  Problem: Alzheimer's disease      Relation: Maternal Grandmother          Age of Onset: (Not Specified)  Problem: Cancer      Relation: Maternal Grandfather          Age of Onset: (Not Specified)          Comment: lung  Problem: Diabetes      Relation: Brother          Age of  Onset: (Not Specified)  Problem: No Known Problems      Relation: Paternal Grandmother          Age of Onset: (Not Specified)  Problem: Cancer      Relation: Paternal Grandfather          Age of Onset: (Not Specified)  Problem: Heart disease      Relation: Paternal Uncle          Age of Onset: (Not Specified)  Problem: Cancer      Relation: Maternal Aunt          Age of Onset: (Not Specified)          Comment: lung, smoker    Social History    Tobacco Use      Smoking status: Never Smoker      Smokeless tobacco: Never Used    Alcohol use: No    Drug use: No    Current Outpatient Medications on File Prior to Visit:  aspirin (ECOTRIN) 81 MG EC tablet, Take 81 mg by mouth once daily., Disp: , Rfl:   cetirizine 10 mg Cap, Take by mouth., Disp: , Rfl:   cyanocobalamin 500 MCG tablet, Take 500 mcg by mouth once daily. Vitamin D, Disp: , Rfl:   docusate sodium (COLACE) 100 MG capsule, Take 100 mg by mouth daily as needed for Constipation., Disp: , Rfl:   ESTRING 2 mg vaginal ring, Place vaginally once. , Disp: , Rfl:   multivitamin (THERAGRAN) per tablet, Take 1 tablet by mouth once daily. , Disp: , Rfl:   progesterone (PROMETRIUM) 200 MG capsule, , Disp: , Rfl:   (DISCONTINUED) fluticasone (FLONASE) 50 mcg/actuation nasal spray, INSTILL 2 SPRAYS INTO EACH NOSTRIL ONCE DAILY, Disp: 16 mL, Rfl: 10  (DISCONTINUED) rizatriptan (MAXALT) 10 MG tablet, TAKE ONE TABLET BY MOUTH AS NEEDED FOR MIGRAINE.MAX 2 TABS PER 24 HRS, Disp: 9 tablet, Rfl: 10  baclofen (LIORESAL) 10 MG tablet, Take 1 tablet (10 mg total) by mouth 2 (two) times daily., Disp: 60 tablet, Rfl: 6  trazodone (DESYREL) 50 MG tablet, Take 1 tablet (50 mg total) by mouth every evening. (Patient taking differently: Take 50 mg by mouth nightly as needed. ), Disp: 30 tablet, Rfl: 11    No current facility-administered medications on file prior to visit.     TETANUS VACCINE due on 12/18/1979  Shingles Vaccine(2 of 3) due on 02/06/2014  Mammogram due on  06/02/2019      Review of Systems   Constitutional: Negative for chills, diaphoresis, fatigue, fever and unexpected weight change.   HENT: Negative for congestion, ear pain, hearing loss, postnasal drip and sinus pressure.    Eyes: Negative for itching and visual disturbance.   Respiratory: Negative for cough, chest tightness, shortness of breath and wheezing.    Cardiovascular: Negative for chest pain, palpitations and leg swelling.   Gastrointestinal: Negative for abdominal pain, blood in stool, constipation, diarrhea, nausea and vomiting.   Genitourinary: Negative for dysuria, frequency, hematuria, menstrual problem, pelvic pain, vaginal bleeding and vaginal discharge.   Musculoskeletal: Negative for arthralgias, back pain, joint swelling and myalgias.   Skin: Positive for wound.   Neurological: Negative for dizziness and headaches.   Hematological: Negative for adenopathy.   Psychiatric/Behavioral: Negative for sleep disturbance. The patient is not nervous/anxious.        Objective:      Physical Exam   Constitutional: She is oriented to person, place, and time. She appears well-developed and well-nourished. No distress.   Good blood pressure control  Normal weight with a BMI of 23.5 she is up 5.5 lb from her last visit with me March 2, 2017   HENT:   Head: Normocephalic and atraumatic.   Right Ear: External ear normal.   Left Ear: External ear normal.   Nose: Nose normal.   Mouth/Throat: Oropharynx is clear and moist. No oropharyngeal exudate.   Eyes: Pupils are equal, round, and reactive to light. Conjunctivae and EOM are normal. Right eye exhibits no discharge. Left eye exhibits no discharge. No scleral icterus.   Neck: Normal range of motion. Neck supple. No JVD present. No tracheal deviation present. No thyromegaly present.   Cardiovascular: Normal rate, regular rhythm and normal heart sounds. Exam reveals no gallop and no friction rub.   No murmur heard.  Carotid pulses 2+ without bruit   Pulmonary/Chest:  Effort normal and breath sounds normal. No stridor. No respiratory distress. She has no wheezes. She has no rales. She exhibits no tenderness.   Abdominal: Soft. Bowel sounds are normal. She exhibits no distension and no mass. There is no tenderness. There is no rebound and no guarding. No hernia.   Musculoskeletal: Normal range of motion. She exhibits no edema or tenderness.   Lymphadenopathy:     She has no cervical adenopathy.   Neurological: She is alert and oriented to person, place, and time. She has normal reflexes. She displays normal reflexes. No cranial nerve deficit or sensory deficit. She exhibits normal muscle tone.   Skin: Skin is warm and dry. No rash noted. She is not diaphoretic. No erythema.   Shallow abrasion/laceration left thumb with no evidence of infection   Psychiatric: She has a normal mood and affect. Her behavior is normal. Judgment and thought content normal.   Nursing note and vitals reviewed.      Assessment:       1. Encounter for preventive health examination    2. URI, acute    3. Migraine without status migrainosus, not intractable, unspecified migraine type    4. Gastroesophageal reflux disease, esophagitis presence not specified    5. Irritable bowel syndrome, unspecified type    6. Laceration of left thumb without foreign body without damage to nail, initial encounter    7. BMI 23.0-23.9, adult        Plan:       1. Encounter for preventive health examination  Lab to be scheduled when fasting  - Comprehensive metabolic panel; Future  - CBC auto differential; Future  - Lipid panel; Future    2. Chronic rhinitis  Refill Flonase  - fluticasone propionate (FLONASE) 50 mcg/actuation nasal spray; 2 sprays (100 mcg total) by Each Nare route once daily.  Dispense: 16 mL; Refill: 10    3. Migraine without status migrainosus, not intractable, unspecified migraine type  Refill Maxalt  - rizatriptan (MAXALT) 10 MG tablet; TAKE ONE TABLET BY MOUTH AS NEEDED FOR MIGRAINE.MAX 2 TABS PER 24 HRS   Dispense: 9 tablet; Refill: 10    4. Gastroesophageal reflux disease, esophagitis presence not specified  Asymptomatic currently    5. Irritable bowel syndrome, unspecified type  Asymptomatic currently    6. Laceration of left thumb without foreign body without damage to nail, initial encounter  No evidence of infection but has not had a tetanus in over 10 years  - (In Office Administered) Td Vaccine    7. BMI 23.0-23.9, adult  Normal weight, no changes needed

## 2019-07-03 ENCOUNTER — PATIENT OUTREACH (OUTPATIENT)
Dept: ADMINISTRATIVE | Facility: HOSPITAL | Age: 59
End: 2019-07-03

## 2019-07-03 NOTE — LETTER
July 3, 2019    DR SWAIN             [facility]  [address]  [facility] [address]  Phone: [phone] July 3, 2019     Patient: Joselyn Hurd    YOB: 1960   Date of Visit: 7/3/2019       To Whom It May Concern:    We are seeing Joselyn Hurd in the clinic today at [facility].  Sebastián Thacker MD is their PCP.  She/He has an outstanding lab/procedure at this time when reviewing their chart.  To help with our Health Maintenance records will you please supply the following:   Our process is to request records from the ordering or performing provider, based on information provided by the patient. If you have one or more of these records, please provide them so that the patient's primary care provider can update their chart.     []  Mammogram                                                []  Colonoscopy   [x]  Pap Smear                                                    []  Outside Lab Results   []  Dexa scans                                                   []  Eye Exam   []  Foot Exam                                                     [] Other___________   []  Outside Immunizations                                               Please Fax to [facility] at [phone]       Sincerely    Chelsy ROMO  [facility]  [address] [facility] [address]  Phone [phone]

## 2019-07-05 ENCOUNTER — LAB VISIT (OUTPATIENT)
Dept: LAB | Facility: HOSPITAL | Age: 59
End: 2019-07-05
Attending: FAMILY MEDICINE
Payer: COMMERCIAL

## 2019-07-05 DIAGNOSIS — Z00.00 ENCOUNTER FOR PREVENTIVE HEALTH EXAMINATION: ICD-10-CM

## 2019-07-05 LAB
ALBUMIN SERPL BCP-MCNC: 3.7 G/DL (ref 3.5–5.2)
ALP SERPL-CCNC: 57 U/L (ref 55–135)
ALT SERPL W/O P-5'-P-CCNC: 16 U/L (ref 10–44)
ANION GAP SERPL CALC-SCNC: 10 MMOL/L (ref 8–16)
AST SERPL-CCNC: 17 U/L (ref 10–40)
BASOPHILS # BLD AUTO: 0.08 K/UL (ref 0–0.2)
BASOPHILS NFR BLD: 1.4 % (ref 0–1.9)
BILIRUB SERPL-MCNC: 0.3 MG/DL (ref 0.1–1)
BUN SERPL-MCNC: 17 MG/DL (ref 6–20)
CALCIUM SERPL-MCNC: 9.4 MG/DL (ref 8.7–10.5)
CHLORIDE SERPL-SCNC: 106 MMOL/L (ref 95–110)
CHOLEST SERPL-MCNC: 191 MG/DL (ref 120–199)
CHOLEST/HDLC SERPL: 4.8 {RATIO} (ref 2–5)
CO2 SERPL-SCNC: 25 MMOL/L (ref 23–29)
CREAT SERPL-MCNC: 1 MG/DL (ref 0.5–1.4)
DIFFERENTIAL METHOD: ABNORMAL
EOSINOPHIL # BLD AUTO: 0.5 K/UL (ref 0–0.5)
EOSINOPHIL NFR BLD: 8.7 % (ref 0–8)
ERYTHROCYTE [DISTWIDTH] IN BLOOD BY AUTOMATED COUNT: 12.8 % (ref 11.5–14.5)
EST. GFR  (AFRICAN AMERICAN): >60 ML/MIN/1.73 M^2
EST. GFR  (NON AFRICAN AMERICAN): >60 ML/MIN/1.73 M^2
GLUCOSE SERPL-MCNC: 96 MG/DL (ref 70–110)
HCT VFR BLD AUTO: 40.8 % (ref 37–48.5)
HDLC SERPL-MCNC: 40 MG/DL (ref 40–75)
HDLC SERPL: 20.9 % (ref 20–50)
HGB BLD-MCNC: 13.1 G/DL (ref 12–16)
IMM GRANULOCYTES # BLD AUTO: 0.01 K/UL (ref 0–0.04)
IMM GRANULOCYTES NFR BLD AUTO: 0.2 % (ref 0–0.5)
LDLC SERPL CALC-MCNC: 122.8 MG/DL (ref 63–159)
LYMPHOCYTES # BLD AUTO: 2.2 K/UL (ref 1–4.8)
LYMPHOCYTES NFR BLD: 39 % (ref 18–48)
MCH RBC QN AUTO: 29.1 PG (ref 27–31)
MCHC RBC AUTO-ENTMCNC: 32.1 G/DL (ref 32–36)
MCV RBC AUTO: 91 FL (ref 82–98)
MONOCYTES # BLD AUTO: 0.6 K/UL (ref 0.3–1)
MONOCYTES NFR BLD: 9.6 % (ref 4–15)
NEUTROPHILS # BLD AUTO: 2.4 K/UL (ref 1.8–7.7)
NEUTROPHILS NFR BLD: 41.1 % (ref 38–73)
NONHDLC SERPL-MCNC: 151 MG/DL
NRBC BLD-RTO: 0 /100 WBC
PLATELET # BLD AUTO: 314 K/UL (ref 150–350)
PMV BLD AUTO: 10 FL (ref 9.2–12.9)
POTASSIUM SERPL-SCNC: 4.5 MMOL/L (ref 3.5–5.1)
PROT SERPL-MCNC: 6.6 G/DL (ref 6–8.4)
RBC # BLD AUTO: 4.5 M/UL (ref 4–5.4)
SODIUM SERPL-SCNC: 141 MMOL/L (ref 136–145)
TRIGL SERPL-MCNC: 141 MG/DL (ref 30–150)
WBC # BLD AUTO: 5.72 K/UL (ref 3.9–12.7)

## 2019-07-05 PROCEDURE — 80053 COMPREHEN METABOLIC PANEL: CPT

## 2019-07-05 PROCEDURE — 80061 LIPID PANEL: CPT

## 2019-07-05 PROCEDURE — 85025 COMPLETE CBC W/AUTO DIFF WBC: CPT

## 2019-07-05 PROCEDURE — 36415 COLL VENOUS BLD VENIPUNCTURE: CPT | Mod: PO

## 2019-07-09 ENCOUNTER — PATIENT OUTREACH (OUTPATIENT)
Dept: ADMINISTRATIVE | Facility: HOSPITAL | Age: 59
End: 2019-07-09

## 2019-09-05 ENCOUNTER — PROCEDURE VISIT (OUTPATIENT)
Dept: SLEEP MEDICINE | Facility: HOSPITAL | Age: 59
End: 2019-09-05
Attending: INTERNAL MEDICINE
Payer: COMMERCIAL

## 2019-09-05 DIAGNOSIS — G47.33 OSA (OBSTRUCTIVE SLEEP APNEA): Primary | ICD-10-CM

## 2019-09-05 PROCEDURE — 95811 POLYSOM 6/>YRS CPAP 4/> PARM: CPT

## 2019-11-13 ENCOUNTER — TELEPHONE (OUTPATIENT)
Dept: FAMILY MEDICINE | Facility: CLINIC | Age: 59
End: 2019-11-13

## 2019-11-13 ENCOUNTER — DOCUMENTATION ONLY (OUTPATIENT)
Dept: FAMILY MEDICINE | Facility: CLINIC | Age: 59
End: 2019-11-13

## 2019-11-13 NOTE — PROGRESS NOTES
Pre-Visit Chart Review  For Appointment Scheduled on (11/14/19)    There are no preventive care reminders to display for this patient.

## 2019-11-14 ENCOUNTER — OFFICE VISIT (OUTPATIENT)
Dept: FAMILY MEDICINE | Facility: CLINIC | Age: 59
End: 2019-11-14
Payer: COMMERCIAL

## 2019-11-14 VITALS
BODY MASS INDEX: 23.98 KG/M2 | TEMPERATURE: 98 F | WEIGHT: 143.94 LBS | DIASTOLIC BLOOD PRESSURE: 80 MMHG | SYSTOLIC BLOOD PRESSURE: 126 MMHG | HEART RATE: 95 BPM | OXYGEN SATURATION: 96 % | HEIGHT: 65 IN

## 2019-11-14 DIAGNOSIS — J20.9 ACUTE BRONCHITIS, UNSPECIFIED ORGANISM: Primary | ICD-10-CM

## 2019-11-14 PROCEDURE — 3008F BODY MASS INDEX DOCD: CPT | Mod: CPTII,S$GLB,, | Performed by: PHYSICIAN ASSISTANT

## 2019-11-14 PROCEDURE — 99213 PR OFFICE/OUTPT VISIT, EST, LEVL III, 20-29 MIN: ICD-10-PCS | Mod: 25,S$GLB,, | Performed by: PHYSICIAN ASSISTANT

## 2019-11-14 PROCEDURE — 99213 OFFICE O/P EST LOW 20 MIN: CPT | Mod: 25,S$GLB,, | Performed by: PHYSICIAN ASSISTANT

## 2019-11-14 PROCEDURE — 96372 THER/PROPH/DIAG INJ SC/IM: CPT | Mod: S$GLB,,, | Performed by: PHYSICIAN ASSISTANT

## 2019-11-14 PROCEDURE — 96372 PR INJECTION,THERAP/PROPH/DIAG2ST, IM OR SUBCUT: ICD-10-PCS | Mod: S$GLB,,, | Performed by: PHYSICIAN ASSISTANT

## 2019-11-14 PROCEDURE — 99999 PR PBB SHADOW E&M-EST. PATIENT-LVL IV: ICD-10-PCS | Mod: PBBFAC,,, | Performed by: PHYSICIAN ASSISTANT

## 2019-11-14 PROCEDURE — 99999 PR PBB SHADOW E&M-EST. PATIENT-LVL IV: CPT | Mod: PBBFAC,,, | Performed by: PHYSICIAN ASSISTANT

## 2019-11-14 PROCEDURE — 3008F PR BODY MASS INDEX (BMI) DOCUMENTED: ICD-10-PCS | Mod: CPTII,S$GLB,, | Performed by: PHYSICIAN ASSISTANT

## 2019-11-14 RX ORDER — PROMETHAZINE HYDROCHLORIDE AND CODEINE PHOSPHATE 6.25; 1 MG/5ML; MG/5ML
5 SOLUTION ORAL EVERY 4 HOURS PRN
Qty: 118 ML | Refills: 0 | Status: SHIPPED | OUTPATIENT
Start: 2019-11-14 | End: 2019-11-24

## 2019-11-14 RX ORDER — GABAPENTIN 100 MG/1
CAPSULE ORAL
COMMUNITY
End: 2021-08-11

## 2019-11-14 RX ORDER — BETAMETHASONE SODIUM PHOSPHATE AND BETAMETHASONE ACETATE 3; 3 MG/ML; MG/ML
12 INJECTION, SUSPENSION INTRA-ARTICULAR; INTRALESIONAL; INTRAMUSCULAR; SOFT TISSUE
Status: COMPLETED | OUTPATIENT
Start: 2019-11-14 | End: 2019-11-14

## 2019-11-14 RX ADMIN — BETAMETHASONE SODIUM PHOSPHATE AND BETAMETHASONE ACETATE 12 MG: 3; 3 INJECTION, SUSPENSION INTRA-ARTICULAR; INTRALESIONAL; INTRAMUSCULAR; SOFT TISSUE at 08:11

## 2019-11-14 NOTE — PATIENT INSTRUCTIONS
Bronchitis, Viral (Adult)    You have a viral bronchitis. Bronchitis is inflammation and swelling of the lining of the lungs. This is often caused by an infection. Symptoms include a dry, hacking cough that is worse at night. The cough may bring up yellow-green mucus. You may also feel short of breath or wheeze. Other symptoms may include tiredness, chest discomfort, and chills.  Bronchitis that is caused by a virus is not treated with antibiotics. Instead, medicines may be given to help relieve symptoms. Symptoms can last up to 2 weeks, although the cough may last much longer.  This illness is contagious during the first few days and is spread through the air by coughing and sneezing, or by direct contact (touching the sick person and then touching your own eyes, nose, or mouth).  Most viral illnesses resolve within 10 to 14 days with rest and simple home remedies, although they may sometimes last for several weeks.  Home care  · If symptoms are severe, rest at home for the first 2 to 3 days. When you go back to your usual activities, don't let yourself get too tired.  · Do not smoke. Also avoid being exposed to secondhand smoke.  · You may use over-the-counter medicine to control fever or pain, unless another pain medicine was prescribed. (Note: If you have chronic liver or kidney disease or have ever had a stomach ulcer or gastrointestinal bleeding, talk with your healthcare provider before using these medicines. Also talk to your provider if you are taking medicine to prevent blood clots.) Aspirin should never be given to anyone younger than 18 years of age who is ill with a viral infection or fever. It may cause severe liver or brain damage.  · Your appetite may be poor, so a light diet is fine. Avoid dehydration by drinking 6 to 8 glasses of fluids per day (such as water, soft drinks, sports drinks, juices, tea, or soup). Extra fluids will help loosen secretions in the nose and lungs.  · Over-the-counter  cough, cold, and sore-throat medicines will not shorten the length of the illness, but they may help to reduce symptoms. (Note: Do not use decongestants if you have high blood pressure.)  Follow-up care  Follow up with your healthcare provider, or as advised. If you had an X-ray or ECG (electrocardiogram), a specialist will review it. You will be notified of any new findings that may affect your care.  Note: If you are age 65 or older, or if you have a chronic lung disease or condition that affects your immune system, or you smoke, talk to your healthcare provider about having pneumococcal vaccinations and a yearly influenza vaccination (flu shot).  When to seek medical advice  Call your healthcare provider right away if any of these occur:  · Fever of 100.4°F (38°C) or higher  · Coughing up increased amounts of colored sputum  · Weakness, drowsiness, headache, facial pain, ear pain, or a stiff neck  Call 911, or get immediate medical care  Contact emergency services right away if any of these occur:  · Coughing up blood  · Worsening weakness, drowsiness, headache, or stiff neck  · Trouble breathing, wheezing, or pain with breathing  Date Last Reviewed: 9/13/2015  © 1905-8034 [employer]. [address] [place] [address], [place] [address]. All rights reserved. This information is not intended as a substitute for professional medical care. Always follow your healthcare professional's instructions.

## 2019-11-14 NOTE — PROGRESS NOTES
Subjective:       Patient ID: Joselyn Hurd is a 58 y.o. female.    Chief Complaint: Cough (3days)    Cough   This is a new problem. The current episode started in the past 7 days. The problem has been unchanged. The problem occurs constantly. The cough is non-productive. Associated symptoms include headaches. Pertinent negatives include no chest pain, chills, ear pain, fever, postnasal drip, rash, rhinorrhea, sore throat, shortness of breath or wheezing. Nothing aggravates the symptoms. She has tried OTC cough suppressant for the symptoms. The treatment provided no relief.     Review of Systems   Constitutional: Positive for fatigue. Negative for activity change, appetite change, chills and fever.   HENT: Positive for voice change. Negative for congestion, ear pain, postnasal drip, rhinorrhea, sinus pressure, sneezing and sore throat.    Respiratory: Positive for cough. Negative for chest tightness, shortness of breath and wheezing.    Cardiovascular: Negative for chest pain.   Gastrointestinal: Negative for abdominal pain, nausea and vomiting.   Musculoskeletal: Negative for back pain.   Skin: Negative for rash.   Neurological: Positive for headaches.       Objective:      Physical Exam   Constitutional: She appears well-developed and well-nourished. No distress.   HENT:   Head: Normocephalic and atraumatic.   Right Ear: Tympanic membrane, external ear and ear canal normal.   Left Ear: Tympanic membrane, external ear and ear canal normal.   Nose: No mucosal edema or rhinorrhea. Right sinus exhibits no maxillary sinus tenderness and no frontal sinus tenderness. Left sinus exhibits no maxillary sinus tenderness and no frontal sinus tenderness.   Mouth/Throat: No oropharyngeal exudate, posterior oropharyngeal edema or posterior oropharyngeal erythema.   Cardiovascular: Normal rate, regular rhythm and normal heart sounds.   Pulmonary/Chest: Effort normal. She has rhonchi. Chest wall is not dull to percussion. She  exhibits no tenderness.   Lymphadenopathy:        Head (right side): No tonsillar adenopathy present.        Head (left side): No tonsillar adenopathy present.     She has no cervical adenopathy.   Skin: Skin is warm and dry. No cyanosis. Nails show no clubbing.   Vitals reviewed.      Assessment:       1. Acute bronchitis, unspecified organism        Plan:       Joselyn was seen today for cough.    Diagnoses and all orders for this visit:    Acute bronchitis, unspecified organism  -     betamethasone acetate-betamethasone sodium phosphate injection 12 mg  -     promethazine-codeine 6.25-10 mg/5 ml (PHENERGAN WITH CODEINE) 6.25-10 mg/5 mL syrup; Take 5 mLs by mouth every 4 (four) hours as needed for Cough.    Joselyn Hurd was given a handout which discussed their disease process, precautions, and instructions for follow-up and therapy.

## 2019-11-14 NOTE — PROGRESS NOTES
Identified patient's name and . Administered 12 mg celestone, IM. Patient tolerated well, aseptic technique maintained. Pain scale 0/10 with injection.

## 2020-07-21 DIAGNOSIS — Z12.31 ENCOUNTER FOR SCREENING MAMMOGRAM FOR MALIGNANT NEOPLASM OF BREAST: Primary | ICD-10-CM

## 2020-07-31 ENCOUNTER — HOSPITAL ENCOUNTER (OUTPATIENT)
Dept: RADIOLOGY | Facility: HOSPITAL | Age: 60
Discharge: HOME | End: 2020-07-31
Attending: OBSTETRICS & GYNECOLOGY
Payer: COMMERCIAL

## 2020-07-31 VITALS — WEIGHT: 143.94 LBS | HEIGHT: 65 IN | BODY MASS INDEX: 23.98 KG/M2

## 2020-07-31 DIAGNOSIS — Z12.31 ENCOUNTER FOR SCREENING MAMMOGRAM FOR MALIGNANT NEOPLASM OF BREAST: ICD-10-CM

## 2020-07-31 PROCEDURE — 77067 SCR MAMMO BI INCL CAD: CPT | Mod: TC,PO

## 2020-08-12 NOTE — TELEPHONE ENCOUNTER
----- Message from Yasir Sinclair sent at 11/13/2019  9:04 AM CST -----  Contact: Ptnt  [phone]  Type:  Same Day Appointment Request    Caller is requesting a same day appointment.  Caller declined first available appointment listed below.      Name of Caller:  Ptnt    When is the first available appointment?  11-14-19    Symptoms:  Head ache, cough.    Best Call Back Number: Ptnt  [phone]    Additional Information:   Advised needs to see the Dr today if possible. Please call to schedule.    
Pt c/o cough/cold, requesting to be seen on the same day. Advised that we don't have any availability today. Scheduled patient at earliest appointment tomorrow 11/14/19 at 820am with ELIZ Diaz. Pt agreed to appointment. Placed patient on wait-list for any cancellation.   
today

## 2020-12-04 ENCOUNTER — CLINICAL SUPPORT (OUTPATIENT)
Dept: FAMILY MEDICINE | Facility: CLINIC | Age: 60
End: 2020-12-04
Payer: COMMERCIAL

## 2020-12-04 ENCOUNTER — TELEPHONE (OUTPATIENT)
Dept: FAMILY MEDICINE | Facility: CLINIC | Age: 60
End: 2020-12-04

## 2020-12-04 DIAGNOSIS — Z23 IMMUNIZATION DUE: Primary | ICD-10-CM

## 2020-12-04 PROCEDURE — 90686 IIV4 VACC NO PRSV 0.5 ML IM: CPT | Mod: S$GLB,,, | Performed by: FAMILY MEDICINE

## 2020-12-04 PROCEDURE — 90686 FLU VACCINE (QUAD) GREATER THAN OR EQUAL TO 3YO PRESERVATIVE FREE IM: ICD-10-PCS | Mod: S$GLB,,, | Performed by: FAMILY MEDICINE

## 2020-12-04 PROCEDURE — 90471 IMMUNIZATION ADMIN: CPT | Mod: S$GLB,,, | Performed by: FAMILY MEDICINE

## 2020-12-04 PROCEDURE — 90471 FLU VACCINE (QUAD) GREATER THAN OR EQUAL TO 3YO PRESERVATIVE FREE IM: ICD-10-PCS | Mod: S$GLB,,, | Performed by: FAMILY MEDICINE

## 2020-12-04 NOTE — TELEPHONE ENCOUNTER
Patient will come in today for flu injection. We do not have Shingrix at this time. Patient advised to check with her insurance to see if they cover it and where either office or pharmacy.

## 2020-12-04 NOTE — TELEPHONE ENCOUNTER
----- Message from Subha Alicia sent at 12/4/2020 10:05 AM CST -----  Contact: Pt  Needs Medical Advice  Who Called: Patient  Symptoms:   How Long Has the Patient had these Symptoms:   Pharmacy Name and Phone #:   Best Contact #: [phone]  Additional Information: Pt is requesting to come in for a flu shot and for a shingles booster shot. Please call back and advise. Thank you

## 2021-01-10 ENCOUNTER — IMMUNIZATION (OUTPATIENT)
Dept: PRIMARY CARE CLINIC | Facility: CLINIC | Age: 61
End: 2021-01-10
Payer: COMMERCIAL

## 2021-01-10 DIAGNOSIS — Z23 NEED FOR VACCINATION: ICD-10-CM

## 2021-01-10 PROCEDURE — 0001A COVID-19, MRNA, LNP-S, PF, 30 MCG/0.3 ML DOSE VACCINE: ICD-10-PCS | Mod: S$GLB,,, | Performed by: FAMILY MEDICINE

## 2021-01-10 PROCEDURE — 0001A COVID-19, MRNA, LNP-S, PF, 30 MCG/0.3 ML DOSE VACCINE: CPT | Mod: S$GLB,,, | Performed by: FAMILY MEDICINE

## 2021-01-10 PROCEDURE — 91300 COVID-19, MRNA, LNP-S, PF, 30 MCG/0.3 ML DOSE VACCINE: ICD-10-PCS | Mod: S$GLB,,, | Performed by: FAMILY MEDICINE

## 2021-01-10 PROCEDURE — 91300 COVID-19, MRNA, LNP-S, PF, 30 MCG/0.3 ML DOSE VACCINE: CPT | Mod: S$GLB,,, | Performed by: FAMILY MEDICINE

## 2021-01-31 ENCOUNTER — IMMUNIZATION (OUTPATIENT)
Dept: PRIMARY CARE CLINIC | Facility: CLINIC | Age: 61
End: 2021-01-31
Payer: COMMERCIAL

## 2021-01-31 DIAGNOSIS — Z23 NEED FOR VACCINATION: Primary | ICD-10-CM

## 2021-01-31 PROCEDURE — 0002A COVID-19, MRNA, LNP-S, PF, 30 MCG/0.3 ML DOSE VACCINE: CPT | Mod: S$GLB,,, | Performed by: FAMILY MEDICINE

## 2021-01-31 PROCEDURE — 91300 COVID-19, MRNA, LNP-S, PF, 30 MCG/0.3 ML DOSE VACCINE: CPT | Mod: S$GLB,,, | Performed by: FAMILY MEDICINE

## 2021-01-31 PROCEDURE — 0002A COVID-19, MRNA, LNP-S, PF, 30 MCG/0.3 ML DOSE VACCINE: ICD-10-PCS | Mod: S$GLB,,, | Performed by: FAMILY MEDICINE

## 2021-01-31 PROCEDURE — 91300 COVID-19, MRNA, LNP-S, PF, 30 MCG/0.3 ML DOSE VACCINE: ICD-10-PCS | Mod: S$GLB,,, | Performed by: FAMILY MEDICINE

## 2021-07-21 DIAGNOSIS — G43.909 MIGRAINE WITHOUT STATUS MIGRAINOSUS, NOT INTRACTABLE, UNSPECIFIED MIGRAINE TYPE: ICD-10-CM

## 2021-07-23 RX ORDER — RIZATRIPTAN BENZOATE 10 MG/1
TABLET ORAL
Qty: 9 TABLET | Refills: 1 | Status: SHIPPED | OUTPATIENT
Start: 2021-07-23 | End: 2021-12-01

## 2021-08-04 DIAGNOSIS — J31.0 CHRONIC RHINITIS: ICD-10-CM

## 2021-08-06 RX ORDER — FLUTICASONE PROPIONATE 50 MCG
SPRAY, SUSPENSION (ML) NASAL
Qty: 48 ML | Refills: 0 | Status: SHIPPED | OUTPATIENT
Start: 2021-08-06 | End: 2021-11-04

## 2021-08-11 ENCOUNTER — OFFICE VISIT (OUTPATIENT)
Dept: FAMILY MEDICINE | Facility: CLINIC | Age: 61
End: 2021-08-11
Attending: FAMILY MEDICINE
Payer: COMMERCIAL

## 2021-08-11 VITALS
WEIGHT: 140 LBS | OXYGEN SATURATION: 99 % | DIASTOLIC BLOOD PRESSURE: 66 MMHG | HEIGHT: 65 IN | HEART RATE: 68 BPM | TEMPERATURE: 98 F | BODY MASS INDEX: 23.32 KG/M2 | SYSTOLIC BLOOD PRESSURE: 106 MMHG

## 2021-08-11 DIAGNOSIS — G43.909 MIGRAINE WITHOUT STATUS MIGRAINOSUS, NOT INTRACTABLE, UNSPECIFIED MIGRAINE TYPE: ICD-10-CM

## 2021-08-11 DIAGNOSIS — Z00.00 ENCOUNTER FOR PREVENTIVE HEALTH EXAMINATION: Primary | ICD-10-CM

## 2021-08-11 DIAGNOSIS — N32.81 OAB (OVERACTIVE BLADDER): ICD-10-CM

## 2021-08-11 DIAGNOSIS — Z12.31 ENCOUNTER FOR SCREENING MAMMOGRAM FOR BREAST CANCER: ICD-10-CM

## 2021-08-11 DIAGNOSIS — Z12.11 COLON CANCER SCREENING: ICD-10-CM

## 2021-08-11 PROCEDURE — 1159F MED LIST DOCD IN RCRD: CPT | Mod: CPTII,S$GLB,, | Performed by: FAMILY MEDICINE

## 2021-08-11 PROCEDURE — 3008F PR BODY MASS INDEX (BMI) DOCUMENTED: ICD-10-PCS | Mod: CPTII,S$GLB,, | Performed by: FAMILY MEDICINE

## 2021-08-11 PROCEDURE — 99999 PR PBB SHADOW E&M-EST. PATIENT-LVL V: ICD-10-PCS | Mod: PBBFAC,,, | Performed by: FAMILY MEDICINE

## 2021-08-11 PROCEDURE — 3008F BODY MASS INDEX DOCD: CPT | Mod: CPTII,S$GLB,, | Performed by: FAMILY MEDICINE

## 2021-08-11 PROCEDURE — 1160F PR REVIEW ALL MEDS BY PRESCRIBER/CLIN PHARMACIST DOCUMENTED: ICD-10-PCS | Mod: CPTII,S$GLB,, | Performed by: FAMILY MEDICINE

## 2021-08-11 PROCEDURE — 3078F PR MOST RECENT DIASTOLIC BLOOD PRESSURE < 80 MM HG: ICD-10-PCS | Mod: CPTII,S$GLB,, | Performed by: FAMILY MEDICINE

## 2021-08-11 PROCEDURE — 1160F RVW MEDS BY RX/DR IN RCRD: CPT | Mod: CPTII,S$GLB,, | Performed by: FAMILY MEDICINE

## 2021-08-11 PROCEDURE — 99999 PR PBB SHADOW E&M-EST. PATIENT-LVL V: CPT | Mod: PBBFAC,,, | Performed by: FAMILY MEDICINE

## 2021-08-11 PROCEDURE — 1159F PR MEDICATION LIST DOCUMENTED IN MEDICAL RECORD: ICD-10-PCS | Mod: CPTII,S$GLB,, | Performed by: FAMILY MEDICINE

## 2021-08-11 PROCEDURE — 99396 PR PREVENTIVE VISIT,EST,40-64: ICD-10-PCS | Mod: S$GLB,,, | Performed by: FAMILY MEDICINE

## 2021-08-11 PROCEDURE — 3074F SYST BP LT 130 MM HG: CPT | Mod: CPTII,S$GLB,, | Performed by: FAMILY MEDICINE

## 2021-08-11 PROCEDURE — 1126F AMNT PAIN NOTED NONE PRSNT: CPT | Mod: CPTII,S$GLB,, | Performed by: FAMILY MEDICINE

## 2021-08-11 PROCEDURE — 99396 PREV VISIT EST AGE 40-64: CPT | Mod: S$GLB,,, | Performed by: FAMILY MEDICINE

## 2021-08-11 PROCEDURE — 1126F PR PAIN SEVERITY QUANTIFIED, NO PAIN PRESENT: ICD-10-PCS | Mod: CPTII,S$GLB,, | Performed by: FAMILY MEDICINE

## 2021-08-11 PROCEDURE — 3078F DIAST BP <80 MM HG: CPT | Mod: CPTII,S$GLB,, | Performed by: FAMILY MEDICINE

## 2021-08-11 PROCEDURE — 3074F PR MOST RECENT SYSTOLIC BLOOD PRESSURE < 130 MM HG: ICD-10-PCS | Mod: CPTII,S$GLB,, | Performed by: FAMILY MEDICINE

## 2021-08-11 RX ORDER — TOPIRAMATE 25 MG/1
TABLET ORAL
Qty: 120 TABLET | Refills: 5 | Status: SHIPPED | OUTPATIENT
Start: 2021-08-11 | End: 2022-09-19 | Stop reason: DRUGHIGH

## 2021-08-11 RX ORDER — LANOLIN ALCOHOL/MO/W.PET/CERES
500 CREAM (GRAM) TOPICAL DAILY
COMMUNITY

## 2021-08-11 RX ORDER — CHOLECALCIFEROL (VITAMIN D3) 25 MCG
1000 TABLET ORAL DAILY
COMMUNITY

## 2021-08-11 RX ORDER — AMOXICILLIN 500 MG
CAPSULE ORAL DAILY
COMMUNITY

## 2021-08-11 RX ORDER — TAMSULOSIN HYDROCHLORIDE 0.4 MG/1
0.4 CAPSULE ORAL DAILY
Qty: 30 CAPSULE | Refills: 5 | Status: SHIPPED | OUTPATIENT
Start: 2021-08-11 | End: 2021-11-02

## 2021-08-13 ENCOUNTER — HOSPITAL ENCOUNTER (OUTPATIENT)
Dept: RADIOLOGY | Facility: HOSPITAL | Age: 61
Discharge: HOME | End: 2021-08-13
Attending: FAMILY MEDICINE
Payer: COMMERCIAL

## 2021-08-13 PROCEDURE — 77067 SCR MAMMO BI INCL CAD: CPT | Mod: TC,PO

## 2021-08-14 ENCOUNTER — LAB VISIT (OUTPATIENT)
Dept: LAB | Facility: HOSPITAL | Age: 61
End: 2021-08-14
Attending: FAMILY MEDICINE
Payer: COMMERCIAL

## 2021-08-14 DIAGNOSIS — Z00.00 ENCOUNTER FOR PREVENTIVE HEALTH EXAMINATION: ICD-10-CM

## 2021-08-14 LAB
ALBUMIN SERPL BCP-MCNC: 3.6 G/DL (ref 3.5–5.2)
ALP SERPL-CCNC: 40 U/L (ref 55–135)
ALT SERPL W/O P-5'-P-CCNC: 16 U/L (ref 10–44)
ANION GAP SERPL CALC-SCNC: 8 MMOL/L (ref 8–16)
AST SERPL-CCNC: 13 U/L (ref 10–40)
BASOPHILS # BLD AUTO: 0.06 K/UL (ref 0–0.2)
BASOPHILS NFR BLD: 0.9 % (ref 0–1.9)
BILIRUB SERPL-MCNC: 0.2 MG/DL (ref 0.1–1)
BUN SERPL-MCNC: 20 MG/DL (ref 6–20)
CALCIUM SERPL-MCNC: 8.6 MG/DL (ref 8.7–10.5)
CHLORIDE SERPL-SCNC: 106 MMOL/L (ref 95–110)
CHOLEST SERPL-MCNC: 156 MG/DL (ref 120–199)
CHOLEST/HDLC SERPL: 3.3 {RATIO} (ref 2–5)
CO2 SERPL-SCNC: 23 MMOL/L (ref 23–29)
CREAT SERPL-MCNC: 0.9 MG/DL (ref 0.5–1.4)
DIFFERENTIAL METHOD: ABNORMAL
EOSINOPHIL # BLD AUTO: 0.5 K/UL (ref 0–0.5)
EOSINOPHIL NFR BLD: 6.6 % (ref 0–8)
ERYTHROCYTE [DISTWIDTH] IN BLOOD BY AUTOMATED COUNT: 13.2 % (ref 11.5–14.5)
EST. GFR  (AFRICAN AMERICAN): >60 ML/MIN/1.73 M^2
EST. GFR  (NON AFRICAN AMERICAN): >60 ML/MIN/1.73 M^2
GLUCOSE SERPL-MCNC: 90 MG/DL (ref 70–110)
HCT VFR BLD AUTO: 32.7 % (ref 37–48.5)
HDLC SERPL-MCNC: 48 MG/DL (ref 40–75)
HDLC SERPL: 30.8 % (ref 20–50)
HGB BLD-MCNC: 10.1 G/DL (ref 12–16)
IMM GRANULOCYTES # BLD AUTO: 0.02 K/UL (ref 0–0.04)
IMM GRANULOCYTES NFR BLD AUTO: 0.3 % (ref 0–0.5)
LDLC SERPL CALC-MCNC: 95.6 MG/DL (ref 63–159)
LYMPHOCYTES # BLD AUTO: 1.7 K/UL (ref 1–4.8)
LYMPHOCYTES NFR BLD: 24.9 % (ref 18–48)
MCH RBC QN AUTO: 28.6 PG (ref 27–31)
MCHC RBC AUTO-ENTMCNC: 30.9 G/DL (ref 32–36)
MCV RBC AUTO: 93 FL (ref 82–98)
MONOCYTES # BLD AUTO: 0.6 K/UL (ref 0.3–1)
MONOCYTES NFR BLD: 8.6 % (ref 4–15)
NEUTROPHILS # BLD AUTO: 4.1 K/UL (ref 1.8–7.7)
NEUTROPHILS NFR BLD: 58.7 % (ref 38–73)
NONHDLC SERPL-MCNC: 108 MG/DL
NRBC BLD-RTO: 0 /100 WBC
PLATELET # BLD AUTO: 336 K/UL (ref 150–450)
PMV BLD AUTO: 10.2 FL (ref 9.2–12.9)
POTASSIUM SERPL-SCNC: 5 MMOL/L (ref 3.5–5.1)
PROT SERPL-MCNC: 6.2 G/DL (ref 6–8.4)
RBC # BLD AUTO: 3.53 M/UL (ref 4–5.4)
SODIUM SERPL-SCNC: 137 MMOL/L (ref 136–145)
TRIGL SERPL-MCNC: 62 MG/DL (ref 30–150)
TSH SERPL DL<=0.005 MIU/L-ACNC: 2.05 UIU/ML (ref 0.4–4)
WBC # BLD AUTO: 6.94 K/UL (ref 3.9–12.7)

## 2021-08-14 PROCEDURE — 80061 LIPID PANEL: CPT | Performed by: FAMILY MEDICINE

## 2021-08-14 PROCEDURE — 84443 ASSAY THYROID STIM HORMONE: CPT | Performed by: FAMILY MEDICINE

## 2021-08-14 PROCEDURE — 36415 COLL VENOUS BLD VENIPUNCTURE: CPT | Mod: PO | Performed by: FAMILY MEDICINE

## 2021-08-14 PROCEDURE — 85025 COMPLETE CBC W/AUTO DIFF WBC: CPT | Performed by: FAMILY MEDICINE

## 2021-08-14 PROCEDURE — 80053 COMPREHEN METABOLIC PANEL: CPT | Performed by: FAMILY MEDICINE

## 2021-08-16 DIAGNOSIS — D64.9 ANEMIA, UNSPECIFIED TYPE: Primary | ICD-10-CM

## 2021-08-16 DIAGNOSIS — D53.9 NUTRITIONAL ANEMIA: ICD-10-CM

## 2021-08-16 PROCEDURE — 82270 OCCULT BLOOD FECES: CPT | Mod: S$GLB,,, | Performed by: FAMILY MEDICINE

## 2021-08-16 PROCEDURE — 82270 POCT HEMOCULT STOOL X3: ICD-10-PCS | Mod: S$GLB,,, | Performed by: FAMILY MEDICINE

## 2021-08-20 ENCOUNTER — LAB VISIT (OUTPATIENT)
Dept: LAB | Facility: HOSPITAL | Age: 61
End: 2021-08-20
Attending: FAMILY MEDICINE
Payer: COMMERCIAL

## 2021-08-20 DIAGNOSIS — D64.9 ANEMIA, UNSPECIFIED TYPE: ICD-10-CM

## 2021-08-20 DIAGNOSIS — D53.9 NUTRITIONAL ANEMIA: ICD-10-CM

## 2021-08-20 LAB
FERRITIN SERPL-MCNC: 5 NG/ML (ref 20–300)
FOLATE SERPL-MCNC: 15.3 NG/ML (ref 4–24)
IRON SERPL-MCNC: 41 UG/DL (ref 30–160)
SATURATED IRON: 8 % (ref 20–50)
TOTAL IRON BINDING CAPACITY: 500 UG/DL (ref 250–450)
TRANSFERRIN SERPL-MCNC: 338 MG/DL (ref 200–375)
VIT B12 SERPL-MCNC: 346 PG/ML (ref 210–950)

## 2021-08-20 PROCEDURE — 85045 AUTOMATED RETICULOCYTE COUNT: CPT | Performed by: FAMILY MEDICINE

## 2021-08-20 PROCEDURE — 82728 ASSAY OF FERRITIN: CPT | Performed by: FAMILY MEDICINE

## 2021-08-20 PROCEDURE — 82746 ASSAY OF FOLIC ACID SERUM: CPT | Performed by: FAMILY MEDICINE

## 2021-08-20 PROCEDURE — 82607 VITAMIN B-12: CPT | Performed by: FAMILY MEDICINE

## 2021-08-20 PROCEDURE — 36415 COLL VENOUS BLD VENIPUNCTURE: CPT | Mod: PO | Performed by: FAMILY MEDICINE

## 2021-08-20 PROCEDURE — 84466 ASSAY OF TRANSFERRIN: CPT | Performed by: FAMILY MEDICINE

## 2021-08-21 LAB — RETICS/RBC NFR AUTO: 1.9 % (ref 0.5–2.5)

## 2021-08-24 ENCOUNTER — TELEPHONE (OUTPATIENT)
Dept: FAMILY MEDICINE | Facility: CLINIC | Age: 61
End: 2021-08-24

## 2021-09-03 ENCOUNTER — TELEPHONE (OUTPATIENT)
Dept: FAMILY MEDICINE | Facility: CLINIC | Age: 61
End: 2021-09-03

## 2021-09-03 DIAGNOSIS — D50.0 IRON DEFICIENCY ANEMIA DUE TO CHRONIC BLOOD LOSS: Primary | ICD-10-CM

## 2021-09-03 LAB
CTP QC/QA: YES
FECAL OCCULT BLOOD, POC: NEGATIVE

## 2021-09-08 ENCOUNTER — TELEPHONE (OUTPATIENT)
Dept: FAMILY MEDICINE | Facility: CLINIC | Age: 61
End: 2021-09-08

## 2021-09-08 ENCOUNTER — LAB VISIT (OUTPATIENT)
Dept: LAB | Facility: HOSPITAL | Age: 61
End: 2021-09-08
Attending: FAMILY MEDICINE
Payer: COMMERCIAL

## 2021-09-08 DIAGNOSIS — D50.0 IRON DEFICIENCY ANEMIA DUE TO CHRONIC BLOOD LOSS: ICD-10-CM

## 2021-09-08 LAB
BASOPHILS # BLD AUTO: 0.05 K/UL (ref 0–0.2)
BASOPHILS NFR BLD: 1 % (ref 0–1.9)
DIFFERENTIAL METHOD: ABNORMAL
EOSINOPHIL # BLD AUTO: 0.3 K/UL (ref 0–0.5)
EOSINOPHIL NFR BLD: 6.8 % (ref 0–8)
ERYTHROCYTE [DISTWIDTH] IN BLOOD BY AUTOMATED COUNT: 13.7 % (ref 11.5–14.5)
HCT VFR BLD AUTO: 35.7 % (ref 37–48.5)
HGB BLD-MCNC: 11.3 G/DL (ref 12–16)
IMM GRANULOCYTES # BLD AUTO: 0.01 K/UL (ref 0–0.04)
IMM GRANULOCYTES NFR BLD AUTO: 0.2 % (ref 0–0.5)
LYMPHOCYTES # BLD AUTO: 1.8 K/UL (ref 1–4.8)
LYMPHOCYTES NFR BLD: 36.8 % (ref 18–48)
MCH RBC QN AUTO: 29.1 PG (ref 27–31)
MCHC RBC AUTO-ENTMCNC: 31.7 G/DL (ref 32–36)
MCV RBC AUTO: 92 FL (ref 82–98)
MONOCYTES # BLD AUTO: 0.4 K/UL (ref 0.3–1)
MONOCYTES NFR BLD: 7.8 % (ref 4–15)
NEUTROPHILS # BLD AUTO: 2.3 K/UL (ref 1.8–7.7)
NEUTROPHILS NFR BLD: 47.4 % (ref 38–73)
NRBC BLD-RTO: 0 /100 WBC
PLATELET # BLD AUTO: 281 K/UL (ref 150–450)
PMV BLD AUTO: 10.3 FL (ref 9.2–12.9)
RBC # BLD AUTO: 3.88 M/UL (ref 4–5.4)
WBC # BLD AUTO: 4.87 K/UL (ref 3.9–12.7)

## 2021-09-08 PROCEDURE — 85025 COMPLETE CBC W/AUTO DIFF WBC: CPT | Performed by: FAMILY MEDICINE

## 2021-09-08 PROCEDURE — 36415 COLL VENOUS BLD VENIPUNCTURE: CPT | Mod: PO | Performed by: FAMILY MEDICINE

## 2021-09-09 DIAGNOSIS — D64.9 ANEMIA, UNSPECIFIED TYPE: Primary | ICD-10-CM

## 2021-10-08 ENCOUNTER — IMMUNIZATION (OUTPATIENT)
Dept: PRIMARY CARE CLINIC | Facility: CLINIC | Age: 61
End: 2021-10-08
Payer: COMMERCIAL

## 2021-10-08 DIAGNOSIS — Z23 NEED FOR VACCINATION: Primary | ICD-10-CM

## 2021-10-08 PROCEDURE — 0003A COVID-19, MRNA, LNP-S, PF, 30 MCG/0.3 ML DOSE VACCINE: ICD-10-PCS | Mod: S$GLB,,, | Performed by: FAMILY MEDICINE

## 2021-10-08 PROCEDURE — 91300 COVID-19, MRNA, LNP-S, PF, 30 MCG/0.3 ML DOSE VACCINE: CPT | Mod: S$GLB,,, | Performed by: FAMILY MEDICINE

## 2021-10-08 PROCEDURE — 91300 COVID-19, MRNA, LNP-S, PF, 30 MCG/0.3 ML DOSE VACCINE: ICD-10-PCS | Mod: S$GLB,,, | Performed by: FAMILY MEDICINE

## 2021-10-08 PROCEDURE — 0003A COVID-19, MRNA, LNP-S, PF, 30 MCG/0.3 ML DOSE VACCINE: CPT | Mod: S$GLB,,, | Performed by: FAMILY MEDICINE

## 2021-10-27 ENCOUNTER — LAB VISIT (OUTPATIENT)
Dept: LAB | Facility: HOSPITAL | Age: 61
End: 2021-10-27
Attending: FAMILY MEDICINE
Payer: COMMERCIAL

## 2021-10-27 DIAGNOSIS — D64.9 ANEMIA, UNSPECIFIED TYPE: ICD-10-CM

## 2021-10-27 LAB
BASOPHILS # BLD AUTO: 0.05 K/UL (ref 0–0.2)
BASOPHILS NFR BLD: 1 % (ref 0–1.9)
DIFFERENTIAL METHOD: NORMAL
EOSINOPHIL # BLD AUTO: 0.2 K/UL (ref 0–0.5)
EOSINOPHIL NFR BLD: 5 % (ref 0–8)
ERYTHROCYTE [DISTWIDTH] IN BLOOD BY AUTOMATED COUNT: 14.1 % (ref 11.5–14.5)
HCT VFR BLD AUTO: 39 % (ref 37–48.5)
HGB BLD-MCNC: 12.6 G/DL (ref 12–16)
IMM GRANULOCYTES # BLD AUTO: 0.01 K/UL (ref 0–0.04)
IMM GRANULOCYTES NFR BLD AUTO: 0.2 % (ref 0–0.5)
LYMPHOCYTES # BLD AUTO: 1.7 K/UL (ref 1–4.8)
LYMPHOCYTES NFR BLD: 35.7 % (ref 18–48)
MCH RBC QN AUTO: 29 PG (ref 27–31)
MCHC RBC AUTO-ENTMCNC: 32.3 G/DL (ref 32–36)
MCV RBC AUTO: 90 FL (ref 82–98)
MONOCYTES # BLD AUTO: 0.4 K/UL (ref 0.3–1)
MONOCYTES NFR BLD: 8.7 % (ref 4–15)
NEUTROPHILS # BLD AUTO: 2.4 K/UL (ref 1.8–7.7)
NEUTROPHILS NFR BLD: 49.4 % (ref 38–73)
NRBC BLD-RTO: 0 /100 WBC
PLATELET # BLD AUTO: 303 K/UL (ref 150–450)
PMV BLD AUTO: 10.6 FL (ref 9.2–12.9)
RBC # BLD AUTO: 4.35 M/UL (ref 4–5.4)
WBC # BLD AUTO: 4.84 K/UL (ref 3.9–12.7)

## 2021-10-27 PROCEDURE — 85025 COMPLETE CBC W/AUTO DIFF WBC: CPT | Performed by: FAMILY MEDICINE

## 2021-10-27 PROCEDURE — 36415 COLL VENOUS BLD VENIPUNCTURE: CPT | Mod: PO | Performed by: FAMILY MEDICINE

## 2021-11-01 ENCOUNTER — TELEPHONE (OUTPATIENT)
Dept: FAMILY MEDICINE | Facility: CLINIC | Age: 61
End: 2021-11-01
Payer: COMMERCIAL

## 2021-11-04 ENCOUNTER — PATIENT OUTREACH (OUTPATIENT)
Dept: ADMINISTRATIVE | Facility: HOSPITAL | Age: 61
End: 2021-11-04
Payer: COMMERCIAL

## 2021-11-04 DIAGNOSIS — J31.0 CHRONIC RHINITIS: ICD-10-CM

## 2021-11-05 RX ORDER — FLUTICASONE PROPIONATE 50 MCG
SPRAY, SUSPENSION (ML) NASAL
Qty: 48 ML | Refills: 3 | Status: SHIPPED | OUTPATIENT
Start: 2021-11-05

## 2021-12-01 DIAGNOSIS — G43.909 MIGRAINE WITHOUT STATUS MIGRAINOSUS, NOT INTRACTABLE, UNSPECIFIED MIGRAINE TYPE: ICD-10-CM

## 2021-12-01 RX ORDER — RIZATRIPTAN BENZOATE 10 MG/1
TABLET ORAL
Qty: 27 TABLET | Refills: 2 | Status: SHIPPED | OUTPATIENT
Start: 2021-12-01 | End: 2022-07-03

## 2022-06-07 ENCOUNTER — TELEPHONE (OUTPATIENT)
Dept: FAMILY MEDICINE | Facility: CLINIC | Age: 62
End: 2022-06-07
Payer: COMMERCIAL

## 2022-06-07 ENCOUNTER — CLINICAL SUPPORT (OUTPATIENT)
Dept: INTERNAL MEDICINE | Facility: CLINIC | Age: 62
End: 2022-06-07
Payer: COMMERCIAL

## 2022-06-07 DIAGNOSIS — Z23 NEED FOR PERTUSSIS VACCINATION: Primary | ICD-10-CM

## 2022-06-07 DIAGNOSIS — Z23 NEED FOR PERTUSSIS VACCINATION: ICD-10-CM

## 2022-06-07 PROCEDURE — 90715 TDAP VACCINE 7 YRS/> IM: CPT | Mod: S$GLB,,, | Performed by: FAMILY MEDICINE

## 2022-06-07 PROCEDURE — 90471 IMMUNIZATION ADMIN: CPT | Mod: S$GLB,,, | Performed by: FAMILY MEDICINE

## 2022-06-07 PROCEDURE — 90471 TDAP VACCINE GREATER THAN OR EQUAL TO 7YO IM: ICD-10-PCS | Mod: S$GLB,,, | Performed by: FAMILY MEDICINE

## 2022-06-07 PROCEDURE — 99999 PR PBB SHADOW E&M-EST. PATIENT-LVL I: CPT | Mod: PBBFAC,,,

## 2022-06-07 PROCEDURE — 99999 PR PBB SHADOW E&M-EST. PATIENT-LVL I: ICD-10-PCS | Mod: PBBFAC,,,

## 2022-06-07 PROCEDURE — 90715 TDAP VACCINE GREATER THAN OR EQUAL TO 7YO IM: ICD-10-PCS | Mod: S$GLB,,, | Performed by: FAMILY MEDICINE

## 2022-06-07 NOTE — TELEPHONE ENCOUNTER
----- Message from Rosey Escalante sent at 6/7/2022  8:07 AM CDT -----  Regarding: pt called  Name of Who is Calling: AKANKSHA ÁLVAREZ [[id number]]      What is the request in detail: pt is requesting a whooping cough vaccination appt. Please advise       Can the clinic reply by [facility]: No      What Number to Call Back if not [facility]:[phone]

## 2022-06-07 NOTE — PROGRESS NOTES
Two person identification name, d.o.b with verbal feedback.  Aseptic technique used.  Administration Tdap  vaccine to the  R Deltoid IM given.  oTlerated well.  waited 15 min.  VIS 8/6/21 given./mp

## 2022-06-21 ENCOUNTER — TELEPHONE (OUTPATIENT)
Dept: FAMILY MEDICINE | Facility: CLINIC | Age: 62
End: 2022-06-21
Payer: COMMERCIAL

## 2022-06-21 DIAGNOSIS — D50.9 IRON DEFICIENCY ANEMIA, UNSPECIFIED IRON DEFICIENCY ANEMIA TYPE: ICD-10-CM

## 2022-06-21 DIAGNOSIS — Z00.00 PREVENTATIVE HEALTH CARE: Primary | ICD-10-CM

## 2022-06-21 DIAGNOSIS — Z12.31 SCREENING MAMMOGRAM FOR BREAST CANCER: ICD-10-CM

## 2022-06-21 NOTE — TELEPHONE ENCOUNTER
Orders in for iron/TIBC, CBC, lipid panel, and CMP.    Order also in for mammogram which needs to be done on or after August 13, 2022

## 2022-06-21 NOTE — TELEPHONE ENCOUNTER
----- Message from Annita Valerio MA sent at 6/21/2022  3:09 PM CDT -----  Type: Needs Medical Advice  Who Called:  pt    Best Call Back Number: [phone]     Additional Information: pt coming in for annual on 9/19 & wants to have labs prior--would like to have them done on 9/12 in [facility]--please advise--thank you

## 2022-07-02 DIAGNOSIS — G43.909 MIGRAINE WITHOUT STATUS MIGRAINOSUS, NOT INTRACTABLE, UNSPECIFIED MIGRAINE TYPE: ICD-10-CM

## 2022-07-02 NOTE — TELEPHONE ENCOUNTER
No new care gaps identified.  [facility] Embedded Care Gaps. Reference number: [id number]. 7/02/2022   7:10:17 AM CDT

## 2022-07-03 RX ORDER — RIZATRIPTAN BENZOATE 10 MG/1
TABLET ORAL
Qty: 27 TABLET | Refills: 0 | Status: SHIPPED | OUTPATIENT
Start: 2022-07-03 | End: 2022-08-15

## 2022-07-03 NOTE — TELEPHONE ENCOUNTER
Refill Decision Note   Joselyn Hurd  is requesting a refill authorization.  Brief Assessment and Rationale for Refill:  Approve     Medication Therapy Plan:       Medication Reconciliation Completed: No   Comments:     No Care Gaps recommended.     Note composed:11:21 AM 07/03/2022

## 2022-08-22 ENCOUNTER — HOSPITAL ENCOUNTER (OUTPATIENT)
Dept: RADIOLOGY | Facility: HOSPITAL | Age: 62
Discharge: HOME | End: 2022-08-22
Attending: FAMILY MEDICINE
Payer: COMMERCIAL

## 2022-08-22 DIAGNOSIS — Z12.31 SCREENING MAMMOGRAM FOR BREAST CANCER: ICD-10-CM

## 2022-08-22 PROCEDURE — 77067 SCR MAMMO BI INCL CAD: CPT | Mod: TC,PO

## 2022-09-12 ENCOUNTER — LAB VISIT (OUTPATIENT)
Dept: LAB | Facility: HOSPITAL | Age: 62
End: 2022-09-12
Attending: FAMILY MEDICINE
Payer: COMMERCIAL

## 2022-09-12 DIAGNOSIS — Z00.00 PREVENTATIVE HEALTH CARE: ICD-10-CM

## 2022-09-12 DIAGNOSIS — D50.9 IRON DEFICIENCY ANEMIA, UNSPECIFIED IRON DEFICIENCY ANEMIA TYPE: ICD-10-CM

## 2022-09-12 LAB
ALBUMIN SERPL BCP-MCNC: 4.1 G/DL (ref 3.5–5.2)
ALP SERPL-CCNC: 48 U/L (ref 55–135)
ALT SERPL W/O P-5'-P-CCNC: 16 U/L (ref 10–44)
ANION GAP SERPL CALC-SCNC: 6 MMOL/L (ref 8–16)
AST SERPL-CCNC: 15 U/L (ref 10–40)
BASOPHILS # BLD AUTO: 0.05 K/UL (ref 0–0.2)
BASOPHILS NFR BLD: 0.9 % (ref 0–1.9)
BILIRUB SERPL-MCNC: 0.3 MG/DL (ref 0.1–1)
BUN SERPL-MCNC: 19 MG/DL (ref 6–20)
CALCIUM SERPL-MCNC: 9.2 MG/DL (ref 8.7–10.5)
CHLORIDE SERPL-SCNC: 108 MMOL/L (ref 95–110)
CHOLEST SERPL-MCNC: 199 MG/DL (ref 120–199)
CHOLEST/HDLC SERPL: 4.3 {RATIO} (ref 2–5)
CO2 SERPL-SCNC: 24 MMOL/L (ref 23–29)
CREAT SERPL-MCNC: 0.9 MG/DL (ref 0.5–1.4)
DIFFERENTIAL METHOD: NORMAL
EOSINOPHIL # BLD AUTO: 0.2 K/UL (ref 0–0.5)
EOSINOPHIL NFR BLD: 3.8 % (ref 0–8)
ERYTHROCYTE [DISTWIDTH] IN BLOOD BY AUTOMATED COUNT: 12.7 % (ref 11.5–14.5)
EST. GFR  (NO RACE VARIABLE): >60 ML/MIN/1.73 M^2
GLUCOSE SERPL-MCNC: 96 MG/DL (ref 70–110)
HCT VFR BLD AUTO: 38.6 % (ref 37–48.5)
HDLC SERPL-MCNC: 46 MG/DL (ref 40–75)
HDLC SERPL: 23.1 % (ref 20–50)
HGB BLD-MCNC: 13.1 G/DL (ref 12–16)
IMM GRANULOCYTES # BLD AUTO: 0.01 K/UL (ref 0–0.04)
IMM GRANULOCYTES NFR BLD AUTO: 0.2 % (ref 0–0.5)
IRON SERPL-MCNC: 47 UG/DL (ref 30–160)
LDLC SERPL CALC-MCNC: 131.8 MG/DL (ref 63–159)
LYMPHOCYTES # BLD AUTO: 1.5 K/UL (ref 1–4.8)
LYMPHOCYTES NFR BLD: 27.1 % (ref 18–48)
MCH RBC QN AUTO: 30 PG (ref 27–31)
MCHC RBC AUTO-ENTMCNC: 33.9 G/DL (ref 32–36)
MCV RBC AUTO: 88 FL (ref 82–98)
MONOCYTES # BLD AUTO: 0.5 K/UL (ref 0.3–1)
MONOCYTES NFR BLD: 8.5 % (ref 4–15)
NEUTROPHILS # BLD AUTO: 3.3 K/UL (ref 1.8–7.7)
NEUTROPHILS NFR BLD: 59.5 % (ref 38–73)
NONHDLC SERPL-MCNC: 153 MG/DL
NRBC BLD-RTO: 0 /100 WBC
PLATELET # BLD AUTO: 284 K/UL (ref 150–450)
PMV BLD AUTO: 10.4 FL (ref 9.2–12.9)
POTASSIUM SERPL-SCNC: 4.7 MMOL/L (ref 3.5–5.1)
PROT SERPL-MCNC: 6.8 G/DL (ref 6–8.4)
RBC # BLD AUTO: 4.37 M/UL (ref 4–5.4)
SATURATED IRON: 11 % (ref 20–50)
SODIUM SERPL-SCNC: 138 MMOL/L (ref 136–145)
TOTAL IRON BINDING CAPACITY: 431 UG/DL (ref 250–450)
TRANSFERRIN SERPL-MCNC: 291 MG/DL (ref 200–375)
TRIGL SERPL-MCNC: 106 MG/DL (ref 30–150)
WBC # BLD AUTO: 5.53 K/UL (ref 3.9–12.7)

## 2022-09-12 PROCEDURE — 36415 COLL VENOUS BLD VENIPUNCTURE: CPT | Mod: PO | Performed by: FAMILY MEDICINE

## 2022-09-12 PROCEDURE — 84466 ASSAY OF TRANSFERRIN: CPT | Performed by: FAMILY MEDICINE

## 2022-09-12 PROCEDURE — 80053 COMPREHEN METABOLIC PANEL: CPT | Performed by: FAMILY MEDICINE

## 2022-09-12 PROCEDURE — 85025 COMPLETE CBC W/AUTO DIFF WBC: CPT | Performed by: FAMILY MEDICINE

## 2022-09-12 PROCEDURE — 80061 LIPID PANEL: CPT | Performed by: FAMILY MEDICINE

## 2022-09-19 ENCOUNTER — TELEPHONE (OUTPATIENT)
Dept: FAMILY MEDICINE | Facility: CLINIC | Age: 62
End: 2022-09-19

## 2022-09-19 ENCOUNTER — OFFICE VISIT (OUTPATIENT)
Dept: FAMILY MEDICINE | Facility: CLINIC | Age: 62
End: 2022-09-19
Attending: FAMILY MEDICINE
Payer: COMMERCIAL

## 2022-09-19 VITALS
WEIGHT: 145.38 LBS | DIASTOLIC BLOOD PRESSURE: 70 MMHG | HEART RATE: 88 BPM | BODY MASS INDEX: 24.19 KG/M2 | SYSTOLIC BLOOD PRESSURE: 108 MMHG | OXYGEN SATURATION: 97 %

## 2022-09-19 DIAGNOSIS — Z00.00 ENCOUNTER FOR PREVENTIVE HEALTH EXAMINATION: Primary | ICD-10-CM

## 2022-09-19 DIAGNOSIS — M21.612 BUNION OF GREAT TOE OF LEFT FOOT: ICD-10-CM

## 2022-09-19 DIAGNOSIS — K58.9 IRRITABLE BOWEL SYNDROME, UNSPECIFIED TYPE: ICD-10-CM

## 2022-09-19 DIAGNOSIS — K21.9 GASTROESOPHAGEAL REFLUX DISEASE, UNSPECIFIED WHETHER ESOPHAGITIS PRESENT: ICD-10-CM

## 2022-09-19 DIAGNOSIS — E78.00 PURE HYPERCHOLESTEROLEMIA: ICD-10-CM

## 2022-09-19 DIAGNOSIS — M19.041 PRIMARY OSTEOARTHRITIS OF RIGHT HAND: ICD-10-CM

## 2022-09-19 DIAGNOSIS — E61.1 IRON DEFICIENCY: ICD-10-CM

## 2022-09-19 DIAGNOSIS — G43.909 MIGRAINE WITHOUT STATUS MIGRAINOSUS, NOT INTRACTABLE, UNSPECIFIED MIGRAINE TYPE: ICD-10-CM

## 2022-09-19 PROCEDURE — 1159F PR MEDICATION LIST DOCUMENTED IN MEDICAL RECORD: ICD-10-PCS | Mod: CPTII,S$GLB,, | Performed by: FAMILY MEDICINE

## 2022-09-19 PROCEDURE — 1159F MED LIST DOCD IN RCRD: CPT | Mod: CPTII,S$GLB,, | Performed by: FAMILY MEDICINE

## 2022-09-19 PROCEDURE — 99396 PREV VISIT EST AGE 40-64: CPT | Mod: S$GLB,,, | Performed by: FAMILY MEDICINE

## 2022-09-19 PROCEDURE — 99999 PR PBB SHADOW E&M-EST. PATIENT-LVL III: ICD-10-PCS | Mod: PBBFAC,,, | Performed by: FAMILY MEDICINE

## 2022-09-19 PROCEDURE — 99999 PR PBB SHADOW E&M-EST. PATIENT-LVL III: CPT | Mod: PBBFAC,,, | Performed by: FAMILY MEDICINE

## 2022-09-19 PROCEDURE — 1160F PR REVIEW ALL MEDS BY PRESCRIBER/CLIN PHARMACIST DOCUMENTED: ICD-10-PCS | Mod: CPTII,S$GLB,, | Performed by: FAMILY MEDICINE

## 2022-09-19 PROCEDURE — 99396 PR PREVENTIVE VISIT,EST,40-64: ICD-10-PCS | Mod: S$GLB,,, | Performed by: FAMILY MEDICINE

## 2022-09-19 PROCEDURE — 3008F BODY MASS INDEX DOCD: CPT | Mod: CPTII,S$GLB,, | Performed by: FAMILY MEDICINE

## 2022-09-19 PROCEDURE — 3074F PR MOST RECENT SYSTOLIC BLOOD PRESSURE < 130 MM HG: ICD-10-PCS | Mod: CPTII,S$GLB,, | Performed by: FAMILY MEDICINE

## 2022-09-19 PROCEDURE — 3078F PR MOST RECENT DIASTOLIC BLOOD PRESSURE < 80 MM HG: ICD-10-PCS | Mod: CPTII,S$GLB,, | Performed by: FAMILY MEDICINE

## 2022-09-19 PROCEDURE — 1160F RVW MEDS BY RX/DR IN RCRD: CPT | Mod: CPTII,S$GLB,, | Performed by: FAMILY MEDICINE

## 2022-09-19 PROCEDURE — 3008F PR BODY MASS INDEX (BMI) DOCUMENTED: ICD-10-PCS | Mod: CPTII,S$GLB,, | Performed by: FAMILY MEDICINE

## 2022-09-19 PROCEDURE — 3074F SYST BP LT 130 MM HG: CPT | Mod: CPTII,S$GLB,, | Performed by: FAMILY MEDICINE

## 2022-09-19 PROCEDURE — 3078F DIAST BP <80 MM HG: CPT | Mod: CPTII,S$GLB,, | Performed by: FAMILY MEDICINE

## 2022-09-19 RX ORDER — TOPIRAMATE 25 MG/1
50 TABLET ORAL 2 TIMES DAILY
Qty: 360 TABLET | Refills: 1 | Status: SHIPPED | OUTPATIENT
Start: 2022-09-19 | End: 2023-03-13

## 2022-09-19 NOTE — TELEPHONE ENCOUNTER
has referred patient to neurology for increased headaches. Please call to schedule appt. Thank you.

## 2022-09-19 NOTE — PROGRESS NOTES
Subjective:       Patient ID: Joselyn Hurd is a 61 y.o. female.    Chief Complaint: Annual Exam    61-year-old female coming in for physical exam.  Lab was done prior to the visit and the results are available.  She has retired and her physical activity has declined.  She had a mild iron deficiency with depletion of her saturated iron but normal serum iron.  She reports that she is having more frequent migraine headaches and often has to take a 2nd max salt.  She is on Topamax but she is only taking 25 mg b.i.d. and never worked up to the full dose.  She asks about referral to Neurology and would like to go to the headache clinic but in the meanwhile she will start increasing the Topamax.  She has a history of reflux and irritable bowel syndrome with a tendency to constipation and does not like to take iron tablets because they aggravate that.  The remainder of her labs look good with a glucose of 96 creatinine of 0.9 GFR greater than 60 white cells of 5.53, hemoglobin of 13.1 and platelets of 515087.  Her total cholesterol was 199, triglyceride 106, HDL of 46, and LDL mildly elevated at 131.    She has some arthritis in the right hand predominantly affecting the D IP joint of the middle finger with some bony enlargement of others but these are not painful.  She has no involvement of the base of the thumbs.  She does not recall any specific injury in the past.    She has a very early bunion formation in the left great toe usually becoming symptomatic when wearing wedge shoes with higher heels.  She has seen Dr. Brennan who offered her several options including surgery but at the time she was working and could not remain out of work as long as recovery would require.  She will reconsider that now that she is retired.    Past Medical History:  No date: GERD (gastroesophageal reflux disease)  No date: IBS (irritable bowel syndrome)  No date: Migraine headache    Past Surgical History:  10/14/11: COLONOSCOPY       Comment:  Dr Ch 2 polyps removed, 5 year recheck  11/09/2016: COLONOSCOPY      Comment:  Dr Ch 5 year swati   No date: DILATION AND CURETTAGE OF UTERUS  No date: TONSILLECTOMY  No date: WISDOM TOOTH EXTRACTION    Review of patient's family history indicates:    Social History    Tobacco Use      Smoking status: Never      Smokeless tobacco: Never    Alcohol use: No    Drug use: No    Current Outpatient Medications on File Prior to Visit:  ascorbic Acid (VITAMIN C) 500 mg CpSR, Take 500 mg by mouth once daily., Disp: , Rfl:   cetirizine 10 mg Cap, Take by mouth., Disp: , Rfl:   COLLAGEN MISC, by Misc.(Non-Drug; Combo Route) route., Disp: , Rfl:   cyanocobalamin 500 MCG tablet, Take 500 mcg by mouth once daily. Vitamin D, Disp: , Rfl:   docusate sodium (COLACE) 100 MG capsule, Take 100 mg by mouth daily as needed for Constipation., Disp: , Rfl:   fluticasone propionate (FLONASE) 50 mcg/actuation nasal spray, INSTILL 2 SPRAYS IN EACH NOSTRIL ONCE A DAY, Disp: 48 mL, Rfl: 3  multivitamin (THERAGRAN) per tablet, Take 1 tablet by mouth once daily. , Disp: , Rfl:   mv-mn/B.coag/B.subtilis/inulin (CULTURELLE PROBIOTIC-MULTIVIT ORAL), Take by mouth., Disp: , Rfl:   progesterone (PROMETRIUM) 200 MG capsule, , Disp: , Rfl:   rizatriptan (MAXALT) 10 MG tablet, TAKE 1 TABLET BY MOUTH AS NEEDED FOR MIGRAINE MAX 2 PER DAY, Disp: 27 tablet, Rfl: 0  TURMERIC ORAL, Take 1,000 mg by mouth., Disp: , Rfl:   UNABLE TO FIND, Pellets-every 4 months, Disp: , Rfl:   vit C/E/Zn/coppr/lutein/zeaxan (PRESERVISION AREDS-2 ORAL), Take by mouth., Disp: , Rfl:   vitamin D (VITAMIN D3) 1000 units Tab, Take 1,000 Units by mouth once daily., Disp: , Rfl:   [DISCONTINUED] topiramate (TOPAMAX) 25 MG tablet, Take 1 at bedtime for 1 week, then 1 twice a day for 1 week, then 1 in AM and 2 at bedtime for 1 week, then 2 twice a day (Patient taking differently: 25 mg. Take 1 at bedtime for 1 week, then 1 twice a day for 1 week, then 1 in AM and 2 at  bedtime for 1 week, then 2 twice a day), Disp: 120 tablet, Rfl: 5  aspirin (ECOTRIN) 81 MG EC tablet, Take 81 mg by mouth once daily., Disp: , Rfl:   baclofen (LIORESAL) 10 MG tablet, Take 1 tablet (10 mg total) by mouth 2 (two) times daily., Disp: 60 tablet, Rfl: 6  omega-3 fatty acids/fish oil (FISH OIL-OMEGA-3 FATTY ACIDS) 300-1,000 mg capsule, Take by mouth once daily., Disp: , Rfl:   [DISCONTINUED] topiramate (TOPAMAX) 25 MG tablet, Take 25 mg by mouth 2 (two) times daily., Disp: , Rfl:     No current facility-administered medications on file prior to visit.        Review of Systems   Constitutional:  Negative for chills, diaphoresis, fatigue, fever and unexpected weight change.   HENT:  Negative for congestion, ear pain, hearing loss, postnasal drip and sinus pressure.    Eyes:  Negative for itching and visual disturbance.   Respiratory:  Negative for cough, chest tightness, shortness of breath and wheezing.    Cardiovascular:  Negative for chest pain, palpitations and leg swelling.   Gastrointestinal:  Negative for abdominal pain, blood in stool, constipation, diarrhea, nausea and vomiting.   Genitourinary:  Negative for dysuria, frequency and hematuria.   Musculoskeletal:  Positive for arthralgias and joint swelling. Negative for back pain and myalgias.   Neurological:  Positive for headaches. Negative for dizziness.   Hematological:  Negative for adenopathy.   Psychiatric/Behavioral:  Negative for sleep disturbance. The patient is not nervous/anxious.      Objective:      Physical Exam  Vitals and nursing note reviewed.   Constitutional:       General: She is not in acute distress.     Appearance: Normal appearance. She is well-developed and normal weight. She is not ill-appearing, toxic-appearing or diaphoretic.      Comments: Good blood pressure control   Normal pulse with regular rhythm  Normal weight with a BMI of 24.2 she is up 5.5 lb from her August 11, 2021 physical   HENT:      Head: Normocephalic  and atraumatic.      Right Ear: Tympanic membrane, ear canal and external ear normal. There is no impacted cerumen.      Left Ear: Tympanic membrane, ear canal and external ear normal. There is no impacted cerumen.      Nose: Nose normal. No congestion or rhinorrhea.      Mouth/Throat:      Mouth: Mucous membranes are moist.      Pharynx: Oropharynx is clear. No oropharyngeal exudate or posterior oropharyngeal erythema.   Eyes:      General: No scleral icterus.        Right eye: No discharge.         Left eye: No discharge.      Extraocular Movements: Extraocular movements intact.      Conjunctiva/sclera: Conjunctivae normal.      Pupils: Pupils are equal, round, and reactive to light.   Neck:      Thyroid: No thyromegaly.      Vascular: No carotid bruit or JVD.   Cardiovascular:      Rate and Rhythm: Normal rate and regular rhythm.      Pulses: Normal pulses.      Heart sounds: Normal heart sounds. No murmur heard.    No friction rub. No gallop.   Pulmonary:      Effort: Pulmonary effort is normal. No respiratory distress.      Breath sounds: Normal breath sounds. No stridor. No wheezing, rhonchi or rales.   Chest:      Chest wall: No tenderness.   Abdominal:      General: Bowel sounds are normal. There is no distension.      Palpations: Abdomen is soft. There is no mass.      Tenderness: There is no abdominal tenderness. There is no guarding or rebound.      Hernia: No hernia is present.   Musculoskeletal:         General: No swelling, tenderness, deformity or signs of injury. Normal range of motion.      Right hand: Swelling (Swelling with mild erythema D IP of middle finger and swelling without erythema of D IP of 4th finger) present.      Cervical back: Normal range of motion and neck supple. No rigidity or tenderness.      Right lower leg: No edema (Trace only).      Left lower leg: No edema (Trace only).      Left foot: Bunion (Very mild valgus deformity with no overriding) present.   Lymphadenopathy:       Cervical: No cervical adenopathy.   Skin:     General: Skin is warm and dry.      Coloration: Skin is not jaundiced or pale.      Findings: No bruising, erythema, lesion or rash.   Neurological:      General: No focal deficit present.      Mental Status: She is alert and oriented to person, place, and time. Mental status is at baseline.      Cranial Nerves: No cranial nerve deficit.      Sensory: No sensory deficit.      Motor: No weakness.      Coordination: Coordination normal.      Gait: Gait normal.      Deep Tendon Reflexes: Reflexes are normal and symmetric. Reflexes normal.   Psychiatric:         Mood and Affect: Mood normal.         Behavior: Behavior normal.         Thought Content: Thought content normal.         Judgment: Judgment normal.       Assessment:       1. Encounter for preventive health examination    2. Migraine without status migrainosus, not intractable, unspecified migraine type    3. Primary osteoarthritis of right hand    4. Bunion of great toe of left foot    5. Irritable bowel syndrome, unspecified type    6. Gastroesophageal reflux disease, unspecified whether esophagitis present    7. Iron deficiency    8. Pure hypercholesterolemia    9. BMI 24.0-24.9, adult          Plan:       1. Encounter for preventive health examination    2. Migraine without status migrainosus, not intractable, unspecified migraine type  Recommend she increase Topamax to one in the morning two in the evening for one week then go to two daily.  We will get her set up for neurology headache clinic but it can be canceled if not needed  - Ambulatory referral/consult to Neurology; Future  - topiramate (TOPAMAX) 25 MG tablet; Take 2 tablets (50 mg total) by mouth 2 (two) times daily.  Dispense: 360 tablet; Refill: 1    3. Primary osteoarthritis of right hand  Limited area suggest she might want to use Voltaren gel instead of systemically    4. Bunion of great toe of left foot  Discussed spacers, lower heel shoes.  Can  follow-up with Dr. Anu LOPEZ if needed    5. Irritable bowel syndrome, unspecified type  Minimally symptomatic but it is worse taking iron supplements    6. Gastroesophageal reflux disease, unspecified whether esophagitis present  Stable    7. Iron deficiency  Suggest iron rich foods such as broccoli, cauliflower, spinach, beets    8. Pure hypercholesterolemia  Lab Results   Component Value Date    CHOL 199 09/12/2022    CHOL 156 08/14/2021    CHOL 191 07/05/2019     Lab Results   Component Value Date    HDL 46 09/12/2022    HDL 48 08/14/2021    HDL 40 07/05/2019     Lab Results   Component Value Date    LDLCALC 131.8 09/12/2022    LDLCALC 95.6 08/14/2021    LDLCALC 122.8 07/05/2019     Lab Results   Component Value Date    TRIG 106 09/12/2022    TRIG 62 08/14/2021    TRIG 141 07/05/2019     Lab Results   Component Value Date    CHOLHDL 23.1 09/12/2022    CHOLHDL 30.8 08/14/2021    CHOLHDL 20.9 07/05/2019     Avoid red meat, fried foods, and cheap cooking oils with trans fats    9. BMI 24.0-24.9, adult  Good weight for age

## 2022-09-19 NOTE — TELEPHONE ENCOUNTER
Called patient to schedule new patient appointment for her headaches (referral from DR Sebastián Thacker), no answer, left voice mail to call clinic to set appointment with first available provider.

## 2022-09-20 ENCOUNTER — IMMUNIZATION (OUTPATIENT)
Dept: PRIMARY CARE CLINIC | Facility: CLINIC | Age: 62
End: 2022-09-20
Payer: COMMERCIAL

## 2022-09-20 DIAGNOSIS — Z23 NEED FOR VACCINATION: Primary | ICD-10-CM

## 2022-09-20 PROCEDURE — 0124A COVID-19, MRNA, LNP-S, BIVALENT BOOSTER, PF, 30 MCG/0.3 ML DOSE: CPT | Mod: S$GLB,,, | Performed by: FAMILY MEDICINE

## 2022-09-20 PROCEDURE — 91312 COVID-19, MRNA, LNP-S, BIVALENT BOOSTER, PF, 30 MCG/0.3 ML DOSE: ICD-10-PCS | Mod: S$GLB,,, | Performed by: FAMILY MEDICINE

## 2022-09-20 PROCEDURE — 0124A COVID-19, MRNA, LNP-S, BIVALENT BOOSTER, PF, 30 MCG/0.3 ML DOSE: ICD-10-PCS | Mod: S$GLB,,, | Performed by: FAMILY MEDICINE

## 2022-09-20 PROCEDURE — 91312 COVID-19, MRNA, LNP-S, BIVALENT BOOSTER, PF, 30 MCG/0.3 ML DOSE: CPT | Mod: S$GLB,,, | Performed by: FAMILY MEDICINE

## 2022-10-12 ENCOUNTER — TELEPHONE (OUTPATIENT)
Dept: FAMILY MEDICINE | Facility: CLINIC | Age: 62
End: 2022-10-12

## 2022-10-12 ENCOUNTER — OFFICE VISIT (OUTPATIENT)
Dept: FAMILY MEDICINE | Facility: CLINIC | Age: 62
End: 2022-10-12
Payer: COMMERCIAL

## 2022-10-12 VITALS
DIASTOLIC BLOOD PRESSURE: 64 MMHG | BODY MASS INDEX: 24.46 KG/M2 | HEIGHT: 65 IN | OXYGEN SATURATION: 97 % | SYSTOLIC BLOOD PRESSURE: 116 MMHG | RESPIRATION RATE: 14 BRPM | HEART RATE: 114 BPM | TEMPERATURE: 99 F | WEIGHT: 146.81 LBS

## 2022-10-12 DIAGNOSIS — J06.9 ACUTE URI: Primary | ICD-10-CM

## 2022-10-12 LAB
CTP QC/QA: YES
INFLUENZA A, MOLECULAR: NEGATIVE
INFLUENZA B, MOLECULAR: NEGATIVE
SARS-COV-2 RDRP RESP QL NAA+PROBE: NEGATIVE
SPECIMEN SOURCE: NORMAL

## 2022-10-12 PROCEDURE — 1160F PR REVIEW ALL MEDS BY PRESCRIBER/CLIN PHARMACIST DOCUMENTED: ICD-10-PCS | Mod: CPTII,S$GLB,, | Performed by: FAMILY MEDICINE

## 2022-10-12 PROCEDURE — 99213 PR OFFICE/OUTPT VISIT, EST, LEVL III, 20-29 MIN: ICD-10-PCS | Mod: S$GLB,,, | Performed by: FAMILY MEDICINE

## 2022-10-12 PROCEDURE — 99999 PR PBB SHADOW E&M-EST. PATIENT-LVL V: ICD-10-PCS | Mod: PBBFAC,,, | Performed by: FAMILY MEDICINE

## 2022-10-12 PROCEDURE — 87635: ICD-10-PCS | Mod: QW,S$GLB,, | Performed by: FAMILY MEDICINE

## 2022-10-12 PROCEDURE — 99999 PR PBB SHADOW E&M-EST. PATIENT-LVL V: CPT | Mod: PBBFAC,,, | Performed by: FAMILY MEDICINE

## 2022-10-12 PROCEDURE — 87502 INFLUENZA DNA AMP PROBE: CPT | Mod: PO | Performed by: FAMILY MEDICINE

## 2022-10-12 PROCEDURE — 87635 SARS-COV-2 COVID-19 AMP PRB: CPT | Mod: QW,S$GLB,, | Performed by: FAMILY MEDICINE

## 2022-10-12 PROCEDURE — 3008F PR BODY MASS INDEX (BMI) DOCUMENTED: ICD-10-PCS | Mod: CPTII,S$GLB,, | Performed by: FAMILY MEDICINE

## 2022-10-12 PROCEDURE — 99213 OFFICE O/P EST LOW 20 MIN: CPT | Mod: S$GLB,,, | Performed by: FAMILY MEDICINE

## 2022-10-12 PROCEDURE — 1159F PR MEDICATION LIST DOCUMENTED IN MEDICAL RECORD: ICD-10-PCS | Mod: CPTII,S$GLB,, | Performed by: FAMILY MEDICINE

## 2022-10-12 PROCEDURE — 1160F RVW MEDS BY RX/DR IN RCRD: CPT | Mod: CPTII,S$GLB,, | Performed by: FAMILY MEDICINE

## 2022-10-12 PROCEDURE — 3008F BODY MASS INDEX DOCD: CPT | Mod: CPTII,S$GLB,, | Performed by: FAMILY MEDICINE

## 2022-10-12 PROCEDURE — 1159F MED LIST DOCD IN RCRD: CPT | Mod: CPTII,S$GLB,, | Performed by: FAMILY MEDICINE

## 2022-10-12 RX ORDER — HYDROCODONE POLISTIREX AND CHLORPHENIRAMINE POLISTIREX 10; 8 MG/5ML; MG/5ML
5 SUSPENSION, EXTENDED RELEASE ORAL EVERY 12 HOURS PRN
Qty: 70 ML | Refills: 0 | Status: SHIPPED | OUTPATIENT
Start: 2022-10-12 | End: 2022-10-22

## 2022-10-12 RX ORDER — ALBUTEROL SULFATE 90 UG/1
2 AEROSOL, METERED RESPIRATORY (INHALATION) 4 TIMES DAILY
Qty: 18 G | Refills: 0 | Status: SHIPPED | OUTPATIENT
Start: 2022-10-12 | End: 2023-04-20

## 2022-10-12 RX ORDER — DOXYCYCLINE 100 MG/1
100 CAPSULE ORAL 2 TIMES DAILY
Qty: 20 CAPSULE | Refills: 0 | Status: SHIPPED | OUTPATIENT
Start: 2022-10-12 | End: 2023-04-20

## 2022-10-12 RX ORDER — BENZONATATE 100 MG/1
100 CAPSULE ORAL 3 TIMES DAILY PRN
Qty: 45 CAPSULE | Refills: 1 | Status: SHIPPED | OUTPATIENT
Start: 2022-10-12 | End: 2022-10-22

## 2022-10-12 NOTE — PROGRESS NOTES
Subjective:       Patient ID: Joselyn Hurd is a 61 y.o. female.    Chief Complaint: Sinusitis (C/o fever with cough, ha x 2 days - otc meds)    New to me patient here for UC visit.  Onset 2 days ago of sever cough; fever to 100.6; HA's, dry cough, SOB w cough ans some post-tussive HA.  Was at [facility] game in [place] past weekend.  Covid negative here today.     Review of Systems   Constitutional:  Positive for fever.   Respiratory:  Positive for cough and shortness of breath.    Cardiovascular:  Negative for chest pain.   Gastrointestinal:  Negative for abdominal pain and nausea.   Skin:  Negative for rash.   Neurological:  Positive for headaches.   All other systems reviewed and are negative.    Objective:      Physical Exam  Constitutional:       General: She is not in acute distress.     Appearance: She is well-developed.   HENT:      Right Ear: Tympanic membrane normal. Tympanic membrane is not erythematous.      Left Ear: Tympanic membrane normal. Tympanic membrane is not erythematous.      Nose: Mucosal edema present.      Right Sinus: No maxillary sinus tenderness.      Left Sinus: No maxillary sinus tenderness.      Mouth/Throat:      Pharynx: Posterior oropharyngeal erythema present.   Cardiovascular:      Rate and Rhythm: Normal rate and regular rhythm.      Heart sounds: No murmur heard.  Pulmonary:      Effort: Pulmonary effort is normal.      Breath sounds: Normal breath sounds. No wheezing or rales.      Comments: Very frequent cough during OV  Musculoskeletal:      Cervical back: Neck supple.   Lymphadenopathy:      Cervical: No cervical adenopathy.   Skin:     General: Skin is warm and dry.       Assessment:       1. Acute URI          Plan:       Neagtive COvid; test for Flu and Rx cough suppression    Acute URI  -     POCT COVID-19 Rapid Screening  -     Influenza A & B by Molecular    Other orders  -     hydrocodone-chlorpheniramine (TUSSIONEX) 10-8 mg/5 mL suspension; Take 5 mLs by mouth every 12  (twelve) hours as needed.  Dispense: 70 mL; Refill: 0  -     albuterol (PROVENTIL/VENTOLIN HFA) 90 mcg/actuation inhaler; Inhale 2 puffs into the lungs 4 (four) times daily.  Dispense: 18 g; Refill: 0  -     benzonatate (TESSALON) 100 MG capsule; Take 1 capsule (100 mg total) by mouth 3 (three) times daily as needed for Cough.  Dispense: 45 capsule; Refill: 1    Patient Instructions   Push fluids intake.  Drink plenty of water.     Vitamin C 1,000 mg three times a day

## 2022-10-12 NOTE — TELEPHONE ENCOUNTER
----- Message from Sebastián Jaffe MD sent at 10/12/2022 11:04 AM CDT -----  Advise pt - Flu test was negative, I am adding Rx for Doxycycline as we discussed.

## 2022-10-13 ENCOUNTER — TELEPHONE (OUTPATIENT)
Dept: FAMILY MEDICINE | Facility: CLINIC | Age: 62
End: 2022-10-13
Payer: COMMERCIAL

## 2022-10-13 NOTE — TELEPHONE ENCOUNTER
----- Message from Genesis Carrera sent at 10/13/2022 10:45 AM CDT -----  Contact: [phone]  Type: Needs Medical Advice  Who Called:  Pt     Best Call Back Number: [phone]    Additional Information: Pt is calling to ask if her virus that she was seen for yesterday can lead to rsv in her grand baby. Pls call back and advise

## 2022-10-13 NOTE — TELEPHONE ENCOUNTER
Patient made aware to practice Covid-like precautions, gita around grandchild to prevent passing virus. Verbalized understanding.

## 2022-11-29 ENCOUNTER — TELEPHONE (OUTPATIENT)
Dept: FAMILY MEDICINE | Facility: CLINIC | Age: 62
End: 2022-11-29

## 2022-11-29 NOTE — TELEPHONE ENCOUNTER
----- Message from Keri George sent at 11/29/2022 10:27 AM CST -----  Regarding: pt return call  Type:  Patient Returning Call    Who Called: AKANKSHA ÁLVAREZ [[id number]]    Who Left Message for Patient: Lora    Does the patient know what this is regarding? Regarding the rizatriptan (MAXALT) 10 MG tablet. Pharmacy stated they needed clinical notes, what was tried, what has failed and why she needs this particular med. To be marked urgent so it can be approved in 3 days. Please advise.      [id number] - [place] - [address]  [address] [address]  Phone: [phone] Fax: [phone]      Best Call Back Number: [phone]     Additional Information:

## 2023-04-20 ENCOUNTER — OFFICE VISIT (OUTPATIENT)
Dept: FAMILY MEDICINE | Facility: CLINIC | Age: 63
End: 2023-04-20
Attending: FAMILY MEDICINE
Payer: COMMERCIAL

## 2023-04-20 VITALS
HEART RATE: 91 BPM | WEIGHT: 145.06 LBS | BODY MASS INDEX: 24.17 KG/M2 | RESPIRATION RATE: 18 BRPM | SYSTOLIC BLOOD PRESSURE: 107 MMHG | OXYGEN SATURATION: 97 % | TEMPERATURE: 99 F | DIASTOLIC BLOOD PRESSURE: 73 MMHG | HEIGHT: 65 IN

## 2023-04-20 DIAGNOSIS — G43.909 MIGRAINE WITHOUT STATUS MIGRAINOSUS, NOT INTRACTABLE, UNSPECIFIED MIGRAINE TYPE: ICD-10-CM

## 2023-04-20 DIAGNOSIS — J45.41 MODERATE PERSISTENT REACTIVE AIRWAY DISEASE WITH ACUTE EXACERBATION: Primary | ICD-10-CM

## 2023-04-20 PROCEDURE — 3008F PR BODY MASS INDEX (BMI) DOCUMENTED: ICD-10-PCS | Mod: CPTII,S$GLB,, | Performed by: FAMILY MEDICINE

## 2023-04-20 PROCEDURE — 1159F MED LIST DOCD IN RCRD: CPT | Mod: CPTII,S$GLB,, | Performed by: FAMILY MEDICINE

## 2023-04-20 PROCEDURE — 3074F PR MOST RECENT SYSTOLIC BLOOD PRESSURE < 130 MM HG: ICD-10-PCS | Mod: CPTII,S$GLB,, | Performed by: FAMILY MEDICINE

## 2023-04-20 PROCEDURE — 3078F DIAST BP <80 MM HG: CPT | Mod: CPTII,S$GLB,, | Performed by: FAMILY MEDICINE

## 2023-04-20 PROCEDURE — 1160F RVW MEDS BY RX/DR IN RCRD: CPT | Mod: CPTII,S$GLB,, | Performed by: FAMILY MEDICINE

## 2023-04-20 PROCEDURE — 3008F BODY MASS INDEX DOCD: CPT | Mod: CPTII,S$GLB,, | Performed by: FAMILY MEDICINE

## 2023-04-20 PROCEDURE — 1160F PR REVIEW ALL MEDS BY PRESCRIBER/CLIN PHARMACIST DOCUMENTED: ICD-10-PCS | Mod: CPTII,S$GLB,, | Performed by: FAMILY MEDICINE

## 2023-04-20 PROCEDURE — 1159F PR MEDICATION LIST DOCUMENTED IN MEDICAL RECORD: ICD-10-PCS | Mod: CPTII,S$GLB,, | Performed by: FAMILY MEDICINE

## 2023-04-20 PROCEDURE — 3078F PR MOST RECENT DIASTOLIC BLOOD PRESSURE < 80 MM HG: ICD-10-PCS | Mod: CPTII,S$GLB,, | Performed by: FAMILY MEDICINE

## 2023-04-20 PROCEDURE — 99213 OFFICE O/P EST LOW 20 MIN: CPT | Mod: S$GLB,,, | Performed by: FAMILY MEDICINE

## 2023-04-20 PROCEDURE — 99999 PR PBB SHADOW E&M-EST. PATIENT-LVL V: ICD-10-PCS | Mod: PBBFAC,,, | Performed by: FAMILY MEDICINE

## 2023-04-20 PROCEDURE — 99999 PR PBB SHADOW E&M-EST. PATIENT-LVL V: CPT | Mod: PBBFAC,,, | Performed by: FAMILY MEDICINE

## 2023-04-20 PROCEDURE — 3074F SYST BP LT 130 MM HG: CPT | Mod: CPTII,S$GLB,, | Performed by: FAMILY MEDICINE

## 2023-04-20 PROCEDURE — 99213 PR OFFICE/OUTPT VISIT, EST, LEVL III, 20-29 MIN: ICD-10-PCS | Mod: S$GLB,,, | Performed by: FAMILY MEDICINE

## 2023-04-20 RX ORDER — HYDROCORTISONE 25 MG/G
CREAM TOPICAL 2 TIMES DAILY PRN
COMMUNITY
Start: 2023-03-14

## 2023-04-20 RX ORDER — PREDNISONE 20 MG/1
20 TABLET ORAL DAILY
Qty: 5 TABLET | Refills: 0 | Status: SHIPPED | OUTPATIENT
Start: 2023-04-20 | End: 2023-04-25

## 2023-04-20 RX ORDER — FLUTICASONE PROPIONATE AND SALMETEROL 250; 50 UG/1; UG/1
1 POWDER RESPIRATORY (INHALATION) 2 TIMES DAILY
Qty: 60 EACH | Refills: 5 | Status: SHIPPED | OUTPATIENT
Start: 2023-04-20 | End: 2023-10-02

## 2023-04-20 RX ORDER — ALBUTEROL SULFATE 90 UG/1
2 AEROSOL, METERED RESPIRATORY (INHALATION) EVERY 6 HOURS PRN
Qty: 18 G | Refills: 5 | Status: SHIPPED | OUTPATIENT
Start: 2023-04-20 | End: 2024-04-19

## 2023-04-20 NOTE — PROGRESS NOTES
Subjective:       Patient ID: Joselyn Hurd is a 62 y.o. female.    Chief Complaint: Cough (Pt states that she is here for a cough. Pt states that she got sick in September and has had a cough since, patient states that she believes she developed an adult asthma) and Migraine (Pt states that with the weather changes her migraines seem to be more frequent)    62-year-old female comes in for a persistent cough since September 2022.  She presented with a relatively recent on set of cough with wheezing.  Testing for COVID-19 and flu were both negative and she was given doxycycline, Proventil inhaler, Tessalon Perles, and some Tussionex cough syrup.  Symptoms improved but did not resolve and she is left with a persistent cough with intermittent wheezing and is concerned that she is developing adult asthma.  She sometimes coughs so severely and so prolonged that she loses bladder control.  She has no fever or chills no night sweats and no chest pain.  Sputum is generally not produced, only a small amount on occasions when her  tries to assist her with some chest physiotherapy.    She has no history of childhood asthma.    Past Medical History:  No date: GERD (gastroesophageal reflux disease)  No date: IBS (irritable bowel syndrome)  No date: Migraine headache    Past Surgical History:  10/14/11: COLONOSCOPY      Comment:  Dr Ch 2 polyps removed, 5 year recheck  11/09/2016: COLONOSCOPY      Comment:  Dr Ch 5 year swati   No date: DILATION AND CURETTAGE OF UTERUS  No date: TONSILLECTOMY  No date: WISDOM TOOTH EXTRACTION    Current Outpatient Medications on File Prior to Visit:  ascorbic Acid (VITAMIN C) 500 mg CpSR, Take 500 mg by mouth once daily., Disp: , Rfl:   cetirizine 10 mg Cap, Take by mouth., Disp: , Rfl:   COLLAGEN MISC, by Misc.(Non-Drug; Combo Route) route., Disp: , Rfl:   cyanocobalamin 500 MCG tablet, Take 500 mcg by mouth once daily. Vitamin D, Disp: , Rfl:   docusate sodium (COLACE) 100 MG  capsule, Take 100 mg by mouth daily as needed for Constipation., Disp: , Rfl:   fluticasone propionate (FLONASE) 50 mcg/actuation nasal spray, INSTILL 2 SPRAYS IN EACH NOSTRIL ONCE A DAY, Disp: 48 mL, Rfl: 3  hydrocortisone 2.5 % cream, Apply topically 2 (two) times daily as needed., Disp: , Rfl:   multivitamin (THERAGRAN) per tablet, Take 1 tablet by mouth once daily. , Disp: , Rfl:   mv-mn/B.coag/B.subtilis/inulin (CULTURELLE PROBIOTIC-MULTIVIT ORAL), Take by mouth., Disp: , Rfl:   progesterone (PROMETRIUM) 200 MG capsule, , Disp: , Rfl:   rizatriptan (MAXALT) 10 MG tablet, TAKE 1 TABLET BY MOUTH AS NEEDED FOR MIGRAINE MAX 2 PER DAY, Disp: 27 tablet, Rfl: 3  topiramate (TOPAMAX) 25 MG tablet, TAKE 2 TABLETS BY MOUTH 2 TIMES DAILY., Disp: 360 tablet, Rfl: 1  UNABLE TO FIND, Pellets-every 4 months, Disp: , Rfl:   vit C/E/Zn/coppr/lutein/zeaxan (PRESERVISION AREDS-2 ORAL), Take by mouth., Disp: , Rfl:   vitamin D (VITAMIN D3) 1000 units Tab, Take 1,000 Units by mouth once daily., Disp: , Rfl:   omega-3 fatty acids/fish oil (FISH OIL-OMEGA-3 FATTY ACIDS) 300-1,000 mg capsule, Take by mouth once daily., Disp: , Rfl:   TURMERIC ORAL, Take 1,000 mg by mouth., Disp: , Rfl:   [DISCONTINUED] albuterol (PROVENTIL/VENTOLIN HFA) 90 mcg/actuation inhaler, Inhale 2 puffs into the lungs 4 (four) times daily. (Patient not taking: Reported on 4/20/2023), Disp: 18 g, Rfl: 0  [DISCONTINUED] aspirin (ECOTRIN) 81 MG EC tablet, Take 81 mg by mouth once daily., Disp: , Rfl:   [DISCONTINUED] baclofen (LIORESAL) 10 MG tablet, Take 1 tablet (10 mg total) by mouth 2 (two) times daily. (Patient not taking: Reported on 4/20/2023), Disp: 60 tablet, Rfl: 6  [DISCONTINUED] doxycycline (MONODOX) 100 MG capsule, Take 1 capsule (100 mg total) by mouth 2 (two) times daily. (Patient not taking: Reported on 4/20/2023), Disp: 20 capsule, Rfl: 0    No current facility-administered medications on file prior to visit.        Review of Systems    Constitutional:  Negative for chills and fever.   Respiratory:  Positive for cough and wheezing. Negative for chest tightness and shortness of breath.    Cardiovascular:  Negative for chest pain.     Objective:      Physical Exam  Vitals and nursing note reviewed.   Constitutional:       Comments: Good blood pressure control  Normal pulse with regular rhythm   Oxygen saturation on room air 97% and afebrile   Normal weight with a BMI of 24.1 she is down 3/10 of a lb since her physical September 19, 2022   HENT:      Head: Normocephalic and atraumatic.      Right Ear: Tympanic membrane, ear canal and external ear normal. There is no impacted cerumen.      Left Ear: Tympanic membrane, ear canal and external ear normal. There is no impacted cerumen.      Nose: Nose normal. No congestion or rhinorrhea.      Mouth/Throat:      Mouth: Mucous membranes are moist.      Pharynx: Oropharynx is clear. No oropharyngeal exudate or posterior oropharyngeal erythema.   Eyes:      General: No scleral icterus.     Extraocular Movements: Extraocular movements intact.      Pupils: Pupils are equal, round, and reactive to light.   Neck:      Vascular: No carotid bruit.   Cardiovascular:      Rate and Rhythm: Normal rate and regular rhythm.      Heart sounds: Normal heart sounds. No murmur heard.    No friction rub. No gallop.   Pulmonary:      Effort: Pulmonary effort is normal. No prolonged expiration, respiratory distress or retractions.      Breath sounds: No decreased air movement or transmitted upper airway sounds. Examination of the right-middle field reveals wheezing. Examination of the left-lower field reveals wheezing. Wheezing present. No decreased breath sounds, rhonchi or rales.   Musculoskeletal:      Cervical back: Normal range of motion and neck supple. No rigidity or tenderness.   Lymphadenopathy:      Cervical: No cervical adenopathy.       Assessment:       1. Moderate persistent reactive airway disease with acute  exacerbation        Plan:       1. Moderate persistent reactive airway disease with acute exacerbation  Check chest x-ray and pulmonary function testing.  Renew rescue inhaler, patient instructed not to take it before the pulmonary function test as one will be given during the test.  Start five day course of 20 mg prednisone daily and start Advair inhaler one puff twice daily.  Patient cautioned to rinse mouth thoroughly afterwards.  - X-Ray Chest PA And Lateral; Future  - Complete PFT w/ bronchodilator; Future  - fluticasone-salmeterol diskus inhaler 250-50 mcg; Inhale 1 puff into the lungs 2 (two) times daily. Controller  Dispense: 60 each; Refill: 5  - albuterol (PROVENTIL HFA) 90 mcg/actuation inhaler; Inhale 2 puffs into the lungs every 6 (six) hours as needed for Wheezing. Rescue  Dispense: 18 g; Refill: 5  - predniSONE (DELTASONE) 20 MG tablet; Take 1 tablet (20 mg total) by mouth once daily. for 5 days  Dispense: 5 tablet; Refill: 0

## 2023-04-25 ENCOUNTER — TELEPHONE (OUTPATIENT)
Dept: FAMILY MEDICINE | Facility: CLINIC | Age: 63
End: 2023-04-25
Payer: COMMERCIAL

## 2023-04-25 ENCOUNTER — HOSPITAL ENCOUNTER (OUTPATIENT)
Dept: RADIOLOGY | Facility: HOSPITAL | Age: 63
Discharge: HOME | End: 2023-04-25
Attending: FAMILY MEDICINE
Payer: COMMERCIAL

## 2023-04-25 DIAGNOSIS — R05.3 CHRONIC COUGH: Primary | ICD-10-CM

## 2023-04-25 DIAGNOSIS — J45.41 MODERATE PERSISTENT REACTIVE AIRWAY DISEASE WITH ACUTE EXACERBATION: ICD-10-CM

## 2023-04-25 PROCEDURE — 71046 X-RAY EXAM CHEST 2 VIEWS: CPT | Mod: TC

## 2023-04-25 NOTE — TELEPHONE ENCOUNTER
----- Message from Sebastián Thacker MD sent at 4/25/2023  1:08 PM CDT -----  Her chest x-ray is normal with clear lungs, normal heart and only some degenerative changes in the spine that show any abnormality at all.

## 2023-04-25 NOTE — TELEPHONE ENCOUNTER
Patient given cxr results. She finished Prednisone and using Advair inhaler. 4/28 is the pft. She has had no change in symptoms.

## 2023-04-26 NOTE — TELEPHONE ENCOUNTER
Patient advised we can refer her to pulmonology after she has her pft done 4/28/23. She is okay with going to [facility] provider Dr. Lamb.

## 2023-04-27 ENCOUNTER — TELEPHONE (OUTPATIENT)
Dept: FAMILY MEDICINE | Facility: CLINIC | Age: 63
End: 2023-04-27
Payer: COMMERCIAL

## 2023-04-28 ENCOUNTER — HOSPITAL ENCOUNTER (OUTPATIENT)
Dept: PULMONOLOGY | Facility: HOSPITAL | Age: 63
Discharge: HOME | End: 2023-04-28
Attending: FAMILY MEDICINE
Payer: COMMERCIAL

## 2023-04-28 DIAGNOSIS — J45.41 MODERATE PERSISTENT REACTIVE AIRWAY DISEASE WITH ACUTE EXACERBATION: ICD-10-CM

## 2023-04-28 PROCEDURE — 94010 BREATHING CAPACITY TEST: CPT | Mod: XB

## 2023-04-28 PROCEDURE — 94060 EVALUATION OF WHEEZING: CPT

## 2023-04-28 PROCEDURE — 94727 GAS DIL/WSHOT DETER LNG VOL: CPT

## 2023-04-28 PROCEDURE — 94729 DIFFUSING CAPACITY: CPT

## 2023-05-04 ENCOUNTER — OFFICE VISIT (OUTPATIENT)
Dept: PULMONOLOGY | Facility: CLINIC | Age: 63
End: 2023-05-04
Attending: FAMILY MEDICINE
Payer: COMMERCIAL

## 2023-05-04 VITALS
HEART RATE: 83 BPM | DIASTOLIC BLOOD PRESSURE: 72 MMHG | WEIGHT: 142.63 LBS | BODY MASS INDEX: 23.76 KG/M2 | SYSTOLIC BLOOD PRESSURE: 111 MMHG | OXYGEN SATURATION: 98 % | HEIGHT: 65 IN

## 2023-05-04 DIAGNOSIS — R05.3 CHRONIC COUGH: ICD-10-CM

## 2023-05-04 DIAGNOSIS — J45.30 MILD PERSISTENT ASTHMA WITHOUT COMPLICATION: Primary | ICD-10-CM

## 2023-05-04 DIAGNOSIS — J45.990 EXERCISE-INDUCED ASTHMA: ICD-10-CM

## 2023-05-04 DIAGNOSIS — J45.991 COUGH VARIANT ASTHMA: ICD-10-CM

## 2023-05-04 DIAGNOSIS — R09.89 CHRONIC SINUS COMPLAINTS: ICD-10-CM

## 2023-05-04 PROCEDURE — 99204 PR OFFICE/OUTPT VISIT, NEW, LEVL IV, 45-59 MIN: ICD-10-PCS | Mod: S$GLB,,, | Performed by: INTERNAL MEDICINE

## 2023-05-04 PROCEDURE — 3074F PR MOST RECENT SYSTOLIC BLOOD PRESSURE < 130 MM HG: ICD-10-PCS | Mod: CPTII,S$GLB,, | Performed by: INTERNAL MEDICINE

## 2023-05-04 PROCEDURE — 3008F BODY MASS INDEX DOCD: CPT | Mod: CPTII,S$GLB,, | Performed by: INTERNAL MEDICINE

## 2023-05-04 PROCEDURE — 1159F MED LIST DOCD IN RCRD: CPT | Mod: CPTII,S$GLB,, | Performed by: INTERNAL MEDICINE

## 2023-05-04 PROCEDURE — 99999 PR PBB SHADOW E&M-EST. PATIENT-LVL V: ICD-10-PCS | Mod: PBBFAC,,, | Performed by: INTERNAL MEDICINE

## 2023-05-04 PROCEDURE — 3008F PR BODY MASS INDEX (BMI) DOCUMENTED: ICD-10-PCS | Mod: CPTII,S$GLB,, | Performed by: INTERNAL MEDICINE

## 2023-05-04 PROCEDURE — 99204 OFFICE O/P NEW MOD 45 MIN: CPT | Mod: S$GLB,,, | Performed by: INTERNAL MEDICINE

## 2023-05-04 PROCEDURE — 99999 PR PBB SHADOW E&M-EST. PATIENT-LVL V: CPT | Mod: PBBFAC,,, | Performed by: INTERNAL MEDICINE

## 2023-05-04 PROCEDURE — 3078F PR MOST RECENT DIASTOLIC BLOOD PRESSURE < 80 MM HG: ICD-10-PCS | Mod: CPTII,S$GLB,, | Performed by: INTERNAL MEDICINE

## 2023-05-04 PROCEDURE — 1159F PR MEDICATION LIST DOCUMENTED IN MEDICAL RECORD: ICD-10-PCS | Mod: CPTII,S$GLB,, | Performed by: INTERNAL MEDICINE

## 2023-05-04 PROCEDURE — 3074F SYST BP LT 130 MM HG: CPT | Mod: CPTII,S$GLB,, | Performed by: INTERNAL MEDICINE

## 2023-05-04 PROCEDURE — 3078F DIAST BP <80 MM HG: CPT | Mod: CPTII,S$GLB,, | Performed by: INTERNAL MEDICINE

## 2023-05-04 RX ORDER — ALBUTEROL SULFATE 90 UG/1
AEROSOL, METERED RESPIRATORY (INHALATION)
Qty: 18 G | Refills: 11 | Status: SHIPPED | OUTPATIENT
Start: 2023-05-04

## 2023-05-04 RX ORDER — MONTELUKAST SODIUM 10 MG/1
10 TABLET ORAL NIGHTLY
Qty: 30 TABLET | Refills: 11 | Status: SHIPPED | OUTPATIENT
Start: 2023-05-04 | End: 2024-03-01 | Stop reason: SDUPTHER

## 2023-05-04 RX ORDER — MOMETASONE FUROATE AND FORMOTEROL FUMARATE DIHYDRATE 200; 5 UG/1; UG/1
2 AEROSOL RESPIRATORY (INHALATION) 2 TIMES DAILY
Qty: 13 G | Refills: 11 | Status: SHIPPED | OUTPATIENT
Start: 2023-05-04 | End: 2024-05-03

## 2023-05-04 RX ORDER — AZITHROMYCIN 500 MG/1
TABLET, FILM COATED ORAL
Qty: 3 TABLET | Refills: 3 | Status: SHIPPED | OUTPATIENT
Start: 2023-05-04

## 2023-05-04 RX ORDER — PREDNISONE 20 MG/1
TABLET ORAL
Qty: 12 TABLET | Refills: 1 | Status: SHIPPED | OUTPATIENT
Start: 2023-05-04

## 2023-05-04 NOTE — PROGRESS NOTES
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  Cough started 9/2022 --  No sinus problem, coughed violently evenings through morning, had min wheezes.  Pt will have some gibson.     Pt had temp to 100.6 with visit Dr Jaffe 10/6/2022 -- no problem visit with Dr Eason 9/2022.   Rx albuterol and  tessalon and doxycyline.  And tussionex-- no help.    No prior history.    Pt lingered cough as above - sinuses felt ok -- uses flonase and allergy pill nicol with good control    Pt seen by Dr Thacker 2 wks ago --    Took prednisone 5 days, cough better and more than prior.    Pt has sinus infections 1-2 yrly.  Uses zpaks.  Has yg mucous with no fever      PFSH:  Past Medical History:   Diagnosis Date    GERD (gastroesophageal reflux disease)     IBS (irritable bowel syndrome)     Migraine headache          Past Surgical History:   Procedure Laterality Date    COLONOSCOPY  10/14/11    Dr Ch 2 polyps removed, 5 year recheck    COLONOSCOPY  11/09/2016    Dr Ch 5 year swati     DILATION AND CURETTAGE OF UTERUS      TONSILLECTOMY      WISDOM TOOTH EXTRACTION       Social History     Tobacco Use    Smoking status: Never    Smokeless tobacco: Never   Substance Use Topics    Alcohol use: No    Drug use: No     Family History   Problem Relation Age of Onset    Hypertension Mother     Kidney disease Mother     Colon polyps Mother     Heart disease Father     Colon cancer Father     Cancer Father         colon/ leukemia     Hearing loss Father     Cataracts Father     Glaucoma Father     Vision loss Father     Kidney disease Father     Hypertension Sister     ADARSH disease Sister     Asthma Sister     Heart disease Sister     Amblyopia Daughter     Vitiligo Daughter     Depression Daughter      "Hypertension Maternal Aunt     Stroke Maternal Aunt     Heart disease Maternal Uncle     Heart disease Maternal Grandmother     Dementia Maternal Grandmother     Alzheimer's disease Maternal Grandmother     Cancer Maternal Grandfather         lung    Diabetes Brother     No Known Problems Paternal Grandmother     Cancer Paternal Grandfather     Heart disease Paternal Uncle     Cancer Maternal Aunt         lung, smoker     Review of patient's allergies indicates:   Allergen Reactions    Phenytoin sodium extended      Other reaction(s): tachycardia    Tramadol Hives    Valacyclovir      Other reaction(s): tachycardia          Review of Systems:  a review of eleven systems covering constitutional, Eye, HEENT, Psych, Respiratory, Cardiac, GI, , Musculoskeletal, Endocrine, Dermatologic was negative except for pertinent findings as listed ABOVE and below:  pertinent positives as above, rest good        Exam:Comprehensive exam done. /72 (BP Location: Left arm, Patient Position: Sitting, BP Method: Medium (Automatic))   Pulse 83   Ht 5' 5" (1.651 m)   Wt 64.7 kg (142 lb 10.2 oz)   SpO2 98% Comment: on room air at rest  BMI 23.74 kg/m²   Exam included Vitals as listed, and patient's appearance and affect and alertness and mood, oral exam for yeast and hygiene and pharynx lesions and Mallapatti (M) score, neck with inspection for jvd and masses and thyroid abnormalities and lymph nodes (supraclavicular and infraclavicular nodes and axillary also examined and noted if abn), chest exam included symmetry and effort and fremitus and percussion and auscultation, cardiac exam included rhythm and gallops and murmur and rubs and jvd and edema, abdominal exam for mass and hepatosplenomegaly and tenderness and hernias and bowel sounds, Musculoskeletal exam with muscle tone and posture and mobility/gait and  strength, and skin for rashes and cyanosis and pallor and turgor, extremity for clubbing.  Findings were normal " except for pertinent findings listed below:  M1, chest is symmetric, no distress, normal percussion, normal fremitus and good normal breath sounds  No edeam         Radiographs (ct chest and cxr) reviewed: view by direct vision   cxr 4/2023 nl    Labs reviewed   low proteins and min eos        PFT results reviewed  4/28/2023 pft [facility] nl cecile, bd, lung vol, and dlco      Plan:  Clinical impression is apparently straight forward and impression with management as below.    Joselyn was seen today for cough.    Diagnoses and all orders for this visit:    Mild persistent asthma without complication  -     predniSONE (DELTASONE) 20 MG tablet; Take one daily for 3 days and may repeat for shortness of breath.  -     albuterol (PROVENTIL/VENTOLIN HFA) 90 mcg/actuation inhaler; 2 puffs every 4 hours as needed for cough, wheeze, or shortness of breath  -     mometasone-formoterol (DULERA) 200-5 mcg/actuation inhaler; Inhale 2 puffs into the lungs 2 (two) times daily. Controller  -     montelukast (SINGULAIR) 10 mg tablet; Take 1 tablet (10 mg total) by mouth every evening.  -     azithromycin (ZITHROMAX) 500 MG tablet; One daily for yellow mucous, repeat if needed    Chronic cough  -     Ambulatory referral/consult to Pulmonology  -     predniSONE (DELTASONE) 20 MG tablet; Take one daily for 3 days and may repeat for shortness of breath.  -     albuterol (PROVENTIL/VENTOLIN HFA) 90 mcg/actuation inhaler; 2 puffs every 4 hours as needed for cough, wheeze, or shortness of breath  -     mometasone-formoterol (DULERA) 200-5 mcg/actuation inhaler; Inhale 2 puffs into the lungs 2 (two) times daily. Controller  -     montelukast (SINGULAIR) 10 mg tablet; Take 1 tablet (10 mg total) by mouth every evening.  -     azithromycin (ZITHROMAX) 500 MG tablet; One daily for yellow mucous, repeat if needed    Cough variant asthma  -     predniSONE (DELTASONE) 20 MG tablet; Take one daily for 3 days and may repeat for shortness of breath.  -      albuterol (PROVENTIL/VENTOLIN HFA) 90 mcg/actuation inhaler; 2 puffs every 4 hours as needed for cough, wheeze, or shortness of breath  -     mometasone-formoterol (DULERA) 200-5 mcg/actuation inhaler; Inhale 2 puffs into the lungs 2 (two) times daily. Controller  -     montelukast (SINGULAIR) 10 mg tablet; Take 1 tablet (10 mg total) by mouth every evening.  -     azithromycin (ZITHROMAX) 500 MG tablet; One daily for yellow mucous, repeat if needed    Exercise-induced asthma  -     predniSONE (DELTASONE) 20 MG tablet; Take one daily for 3 days and may repeat for shortness of breath.  -     albuterol (PROVENTIL/VENTOLIN HFA) 90 mcg/actuation inhaler; 2 puffs every 4 hours as needed for cough, wheeze, or shortness of breath  -     mometasone-formoterol (DULERA) 200-5 mcg/actuation inhaler; Inhale 2 puffs into the lungs 2 (two) times daily. Controller  -     montelukast (SINGULAIR) 10 mg tablet; Take 1 tablet (10 mg total) by mouth every evening.  -     azithromycin (ZITHROMAX) 500 MG tablet; One daily for yellow mucous, repeat if needed    Chronic sinus complaints  -     azithromycin (ZITHROMAX) 500 MG tablet; One daily for yellow mucous, repeat if needed        Follow up if symptoms worsen or fail to improve.    Discussed with patient above for education the following:      Patient Instructions   You may have had prior sinus infections - uses antibiotics twice yearly- ct head 2015 might have had some ethmoid sinusitis?  You had fever and headache as part of initial presentation cough.    Use azithromycin as needed for infection sinus/bronchi.    Continue flonase and allergy pill daily. Add singulair -- helps sinus and asthma but not too potent?  Sijngulair may cause headaches -- not migranes.     Use controller, wixela, til out - try once daily -- may need to stay on controller.  Has 12 hr bronchial med to stabilize airway and inhaled steroids to control asthma--- will give paper script for dulera for 2 puffs up to  twice daily -- has fast acting bronchial medication and inhaled steroid.       Would use albuterol prior to exercise and for any lung symptoms-- rescue-- use 1-2-4 puffs up to every 4 hours as needed.      Cxr viewed and good.    Use prednisone (action plan) for exacerbation -- should follow by controller---- dulera/wixela and/or singulair.    If persistent -- may be reasonable to do ct sinus and immune testing -- seems low yield now.    If asthma persistent in future-- would do breathing testing.      Delmar took 51 min

## 2023-05-04 NOTE — PATIENT INSTRUCTIONS
You may have had prior sinus infections - uses antibiotics twice yearly- ct head 2015 might have had some ethmoid sinusitis?  You had fever and headache as part of initial presentation cough.    Use azithromycin as needed for infection sinus/bronchi.    Continue flonase and allergy pill daily. Add singulair -- helps sinus and asthma but not too potent?  Sijngulair may cause headaches -- not migranes.     Use controller, wixela, til out - try once daily -- may need to stay on controller.  Has 12 hr bronchial med to stabilize airway and inhaled steroids to control asthma--- will give paper script for dulera for 2 puffs up to twice daily -- has fast acting bronchial medication and inhaled steroid.       Would use albuterol prior to exercise and for any lung symptoms-- rescue-- use 1-2-4 puffs up to every 4 hours as needed.      Cxr viewed and good.    Use prednisone (action plan) for exacerbation -- should follow by controller---- dulera/wixela and/or singulair.    If persistent -- may be reasonable to do ct sinus and immune testing -- seems low yield now.    If asthma persistent in future-- would do breathing testing.

## 2023-08-31 DIAGNOSIS — Z12.31 SCREENING MAMMOGRAM FOR HIGH-RISK PATIENT: Primary | ICD-10-CM

## 2023-09-07 ENCOUNTER — HOSPITAL ENCOUNTER (OUTPATIENT)
Dept: RADIOLOGY | Facility: HOSPITAL | Age: 63
Discharge: HOME | End: 2023-09-07
Attending: OBSTETRICS & GYNECOLOGY
Payer: COMMERCIAL

## 2023-09-07 DIAGNOSIS — Z12.31 SCREENING MAMMOGRAM FOR HIGH-RISK PATIENT: ICD-10-CM

## 2023-09-07 PROCEDURE — 77067 SCR MAMMO BI INCL CAD: CPT | Mod: TC,PO

## 2023-09-18 DIAGNOSIS — G43.909 MIGRAINE WITHOUT STATUS MIGRAINOSUS, NOT INTRACTABLE, UNSPECIFIED MIGRAINE TYPE: ICD-10-CM

## 2023-09-18 NOTE — TELEPHONE ENCOUNTER
Refill Routing Note   Medication(s) are not appropriate for processing by [facility] for the following reason(s):      Medication outside of protocol    [facility] action(s):  Route Care Due:  None identified              Appointments  past 12m or future 3m with PCP    Date Provider   Last Visit   4/20/2023 Sebastián Thacker MD   Next Visit   Visit date not found Sebastián Thacker MD   ED visits in past 90 days: 0        Note composed:3:00 PM 09/18/2023

## 2023-09-19 RX ORDER — TOPIRAMATE 25 MG/1
50 TABLET ORAL 2 TIMES DAILY
Qty: 360 TABLET | Refills: 1 | Status: SHIPPED | OUTPATIENT
Start: 2023-09-19

## 2023-10-02 ENCOUNTER — OFFICE VISIT (OUTPATIENT)
Dept: PULMONOLOGY | Facility: CLINIC | Age: 63
End: 2023-10-02
Attending: FAMILY MEDICINE
Payer: COMMERCIAL

## 2023-10-02 VITALS
HEIGHT: 65 IN | BODY MASS INDEX: 23.32 KG/M2 | OXYGEN SATURATION: 99 % | SYSTOLIC BLOOD PRESSURE: 117 MMHG | HEART RATE: 89 BPM | WEIGHT: 140 LBS | DIASTOLIC BLOOD PRESSURE: 72 MMHG

## 2023-10-02 DIAGNOSIS — J45.30 MILD PERSISTENT ASTHMA WITHOUT COMPLICATION: ICD-10-CM

## 2023-10-02 DIAGNOSIS — J45.991 COUGH VARIANT ASTHMA: ICD-10-CM

## 2023-10-02 DIAGNOSIS — R09.89 CHRONIC SINUS COMPLAINTS: ICD-10-CM

## 2023-10-02 DIAGNOSIS — J32.9 RECURRENT SINUS INFECTIONS: Primary | ICD-10-CM

## 2023-10-02 PROCEDURE — 99999 PR PBB SHADOW E&M-EST. PATIENT-LVL IV: ICD-10-PCS | Mod: PBBFAC,,, | Performed by: INTERNAL MEDICINE

## 2023-10-02 PROCEDURE — 3008F BODY MASS INDEX DOCD: CPT | Mod: CPTII,S$GLB,, | Performed by: INTERNAL MEDICINE

## 2023-10-02 PROCEDURE — 99213 OFFICE O/P EST LOW 20 MIN: CPT | Mod: S$GLB,,, | Performed by: INTERNAL MEDICINE

## 2023-10-02 PROCEDURE — 3008F PR BODY MASS INDEX (BMI) DOCUMENTED: ICD-10-PCS | Mod: CPTII,S$GLB,, | Performed by: INTERNAL MEDICINE

## 2023-10-02 PROCEDURE — 99999 PR PBB SHADOW E&M-EST. PATIENT-LVL IV: CPT | Mod: PBBFAC,,, | Performed by: INTERNAL MEDICINE

## 2023-10-02 PROCEDURE — 3078F PR MOST RECENT DIASTOLIC BLOOD PRESSURE < 80 MM HG: ICD-10-PCS | Mod: CPTII,S$GLB,, | Performed by: INTERNAL MEDICINE

## 2023-10-02 PROCEDURE — 1159F MED LIST DOCD IN RCRD: CPT | Mod: CPTII,S$GLB,, | Performed by: INTERNAL MEDICINE

## 2023-10-02 PROCEDURE — 1159F PR MEDICATION LIST DOCUMENTED IN MEDICAL RECORD: ICD-10-PCS | Mod: CPTII,S$GLB,, | Performed by: INTERNAL MEDICINE

## 2023-10-02 PROCEDURE — 3074F PR MOST RECENT SYSTOLIC BLOOD PRESSURE < 130 MM HG: ICD-10-PCS | Mod: CPTII,S$GLB,, | Performed by: INTERNAL MEDICINE

## 2023-10-02 PROCEDURE — 3078F DIAST BP <80 MM HG: CPT | Mod: CPTII,S$GLB,, | Performed by: INTERNAL MEDICINE

## 2023-10-02 PROCEDURE — 3074F SYST BP LT 130 MM HG: CPT | Mod: CPTII,S$GLB,, | Performed by: INTERNAL MEDICINE

## 2023-10-02 PROCEDURE — 99213 PR OFFICE/OUTPT VISIT, EST, LEVL III, 20-29 MIN: ICD-10-PCS | Mod: S$GLB,,, | Performed by: INTERNAL MEDICINE

## 2023-10-02 NOTE — PATIENT INSTRUCTIONS
Chronic recurrent sinusitis -- singulair and other sprays may be controlling.  Taper since stable to minimal regimen would be fine      Persistent cough asthma-- use dulera 1-2 twice daily but once daily ok for controller, use albuterol for rescue (not use recently), and use action plan (prednisone if only cough/wheeze -- azithromycin for yellow mucous -- ok to use both)    With sinus infections twice yearly -- would screen immune system-- blood test -- notify results over phone.

## 2023-10-02 NOTE — PROGRESS NOTES
10/2/2023    Joselyn Hurd  New Patient Consult    Chief Complaint   Patient presents with    Cough    Follow-up       HPI:  10/2/2023  uses dulera daily in am, has occ wheezes but controlled cough.  Sinuses controlled. Uses singulair.     Uses dulera 2 daily and occ wheeze but no  cough...         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  Cough started 9/2022 --  No sinus problem, coughed violently evenings through morning, had min wheezes.  Pt will have some gibson.     Pt had temp to 100.6 with visit Dr Jaffe 10/6/2022 -- no problem visit with Dr Eason 9/2022.   Rx albuterol and  tessalon and doxycyline.  And tussionex-- no help.    No prior history.    Pt lingered cough as above - sinuses felt ok -- uses flonase and allergy pill nicol with good control    Pt seen by Dr Thacker 2 wks ago --    Took prednisone 5 days, cough better and more than prior.    Pt has sinus infections 1-2 yrly.  Uses zpaks.  Has yg mucous with no fever  Patient Instructions   You may have had prior sinus infections - uses antibiotics twice yearly- ct head 2015 might have had some ethmoid sinusitis?  You had fever and headache as part of initial presentation cough.    Use azithromycin as needed for infection sinus/bronchi.    Continue flonase and allergy pill daily. Add singulair -- helps sinus and asthma but not too potent?  Sijngulair may cause headaches -- not migranes.     Use controller, wixela, til out - try once daily -- may need to stay on controller.  Has 12 hr bronchial med to stabilize airway and inhaled steroids to control asthma--- will give paper script for dulera for 2 puffs up to twice daily -- has fast acting bronchial medication and inhaled steroid.       Would use albuterol prior to exercise and for any lung symptoms-- rescue-- use 1-2-4 puffs up to every 4 hours as needed.      Cxr viewed and good.    Use prednisone (action plan) for exacerbation -- should follow by controller---- dulera/wixela and/or  singulair.    If persistent -- may be reasonable to do ct sinus and immune testing -- seems low yield now.    If asthma persistent in future-- would do breathing testing.      PFSH:  Past Medical History:   Diagnosis Date    GERD (gastroesophageal reflux disease)     IBS (irritable bowel syndrome)     Migraine headache          Past Surgical History:   Procedure Laterality Date    COLONOSCOPY  10/14/11    Dr Ch 2 polyps removed, 5 year recheck    COLONOSCOPY  11/09/2016    Dr Ch 5 year swati     DILATION AND CURETTAGE OF UTERUS      TONSILLECTOMY      WISDOM TOOTH EXTRACTION       Social History     Tobacco Use    Smoking status: Never    Smokeless tobacco: Never   Substance Use Topics    Alcohol use: No    Drug use: No     Family History   Problem Relation Age of Onset    Hypertension Mother     Kidney disease Mother     Colon polyps Mother     Heart disease Father     Colon cancer Father     Cancer Father         colon/ leukemia     Hearing loss Father     Cataracts Father     Glaucoma Father     Vision loss Father     Kidney disease Father     Hypertension Sister     ADARSH disease Sister     Asthma Sister     Heart disease Sister     Amblyopia Daughter     Vitiligo Daughter     Depression Daughter     Hypertension Maternal Aunt     Stroke Maternal Aunt     Heart disease Maternal Uncle     Heart disease Maternal Grandmother     Dementia Maternal Grandmother     Alzheimer's disease Maternal Grandmother     Cancer Maternal Grandfather         lung    Diabetes Brother     No Known Problems Paternal Grandmother     Cancer Paternal Grandfather     Heart disease Paternal Uncle     Cancer Maternal Aunt         lung, smoker     Review of patient's allergies indicates:   Allergen Reactions    Phenytoin sodium extended      Other reaction(s): tachycardia    Tramadol Hives    Valacyclovir      Other reaction(s): tachycardia          Review of Systems:  a review of eleven systems covering constitutional, Eye, HEENT,  "Psych, Respiratory, Cardiac, GI, , Musculoskeletal, Endocrine, Dermatologic was negative except for pertinent findings as listed ABOVE and below:  pertinent positives as above, rest good        Exam:Comprehensive exam done. /72 (BP Location: Right arm, Patient Position: Sitting, BP Method: Small (Automatic))   Pulse 89   Ht 5' 5" (1.651 m)   Wt 63.5 kg (140 lb)   SpO2 99% Comment: on room air at rest  BMI 23.30 kg/m²   Exam included Vitals as listed, and patient's appearance and affect and alertness and mood, oral exam for yeast and hygiene and pharynx lesions and Mallapatti (M) score, neck with inspection for jvd and masses and thyroid abnormalities and lymph nodes (supraclavicular and infraclavicular nodes and axillary also examined and noted if abn), chest exam included symmetry and effort and fremitus and percussion and auscultation, cardiac exam included rhythm and gallops and murmur and rubs and jvd and edema, abdominal exam for mass and hepatosplenomegaly and tenderness and hernias and bowel sounds, Musculoskeletal exam with muscle tone and posture and mobility/gait and  strength, and skin for rashes and cyanosis and pallor and turgor, extremity for clubbing.  Findings were normal except for pertinent findings listed below:  M1, chest is symmetric, no distress, normal percussion, normal fremitus and good normal breath sounds  No edeam         Radiographs (ct chest and cxr) reviewed: view by direct vision   cxr 4/2023 nl    Labs reviewed   low proteins and min eos        PFT results reviewed  4/28/2023 pft [facility] nl cecile, bd, lung vol, and dlco      Plan:  Clinical impression is apparently straight forward and impression with management as below.    Joselyn was seen today for cough and follow-up.    Diagnoses and all orders for this visit:    Recurrent sinus infections  -     IgG; Future  -     IGM; Future  -     Humoral Immune Eval (Pneumo Serotypes) With H. Flu; Future    Chronic sinus " complaints    Cough variant asthma    Mild persistent asthma without complication          Follow up if symptoms worsen or fail to improve.    Discussed with patient above for education the following:      Patient Instructions   Chronic recurrent sinusitis -- singulair and other sprays may be controlling.  Taper since stable to minimal regimen would be fine      Persistent cough asthma-- use dulera 1-2 twice daily but once daily ok for controller, use albuterol for rescue (not use recently), and use action plan (prednisone if only cough/wheeze -- azithromycin for yellow mucous -- ok to use both)    With sinus infections twice yearly -- would screen immune system-- blood test -- notify results over phone.

## 2023-10-05 ENCOUNTER — LAB VISIT (OUTPATIENT)
Dept: LAB | Facility: HOSPITAL | Age: 63
End: 2023-10-05
Attending: INTERNAL MEDICINE
Payer: COMMERCIAL

## 2023-10-05 ENCOUNTER — OFFICE VISIT (OUTPATIENT)
Dept: FAMILY MEDICINE | Facility: CLINIC | Age: 63
End: 2023-10-05
Attending: FAMILY MEDICINE
Payer: COMMERCIAL

## 2023-10-05 VITALS
TEMPERATURE: 99 F | DIASTOLIC BLOOD PRESSURE: 71 MMHG | RESPIRATION RATE: 17 BRPM | HEIGHT: 65 IN | OXYGEN SATURATION: 96 % | SYSTOLIC BLOOD PRESSURE: 115 MMHG | HEART RATE: 90 BPM | WEIGHT: 139.56 LBS | BODY MASS INDEX: 23.25 KG/M2

## 2023-10-05 DIAGNOSIS — K21.9 GASTROESOPHAGEAL REFLUX DISEASE, UNSPECIFIED WHETHER ESOPHAGITIS PRESENT: ICD-10-CM

## 2023-10-05 DIAGNOSIS — E61.1 IRON DEFICIENCY: ICD-10-CM

## 2023-10-05 DIAGNOSIS — Z91.89 PNEUMOCOCCAL VACCINATION INDICATED: ICD-10-CM

## 2023-10-05 DIAGNOSIS — J45.30 MILD PERSISTENT ASTHMA WITHOUT COMPLICATION: ICD-10-CM

## 2023-10-05 DIAGNOSIS — G43.909 MIGRAINE WITHOUT STATUS MIGRAINOSUS, NOT INTRACTABLE, UNSPECIFIED MIGRAINE TYPE: ICD-10-CM

## 2023-10-05 DIAGNOSIS — J45.991 COUGH VARIANT ASTHMA: ICD-10-CM

## 2023-10-05 DIAGNOSIS — K58.9 IRRITABLE BOWEL SYNDROME, UNSPECIFIED TYPE: ICD-10-CM

## 2023-10-05 DIAGNOSIS — Z00.00 ENCOUNTER FOR PREVENTIVE HEALTH EXAMINATION: ICD-10-CM

## 2023-10-05 DIAGNOSIS — Z00.00 ENCOUNTER FOR PREVENTIVE HEALTH EXAMINATION: Primary | ICD-10-CM

## 2023-10-05 DIAGNOSIS — J32.9 RECURRENT SINUS INFECTIONS: ICD-10-CM

## 2023-10-05 DIAGNOSIS — N32.81 OVERACTIVE BLADDER: ICD-10-CM

## 2023-10-05 LAB
ALBUMIN SERPL BCP-MCNC: 4.3 G/DL (ref 3.5–5.2)
ALP SERPL-CCNC: 51 U/L (ref 55–135)
ALT SERPL W/O P-5'-P-CCNC: 16 U/L (ref 10–44)
ANION GAP SERPL CALC-SCNC: 10 MMOL/L (ref 8–16)
AST SERPL-CCNC: 14 U/L (ref 10–40)
BASOPHILS # BLD AUTO: 0.04 K/UL (ref 0–0.2)
BASOPHILS NFR BLD: 0.7 % (ref 0–1.9)
BILIRUB SERPL-MCNC: 0.3 MG/DL (ref 0.1–1)
BILIRUBIN, UA POC OHS: NEGATIVE
BLOOD, UA POC OHS: NEGATIVE
BUN SERPL-MCNC: 18 MG/DL (ref 8–23)
CALCIUM SERPL-MCNC: 9.4 MG/DL (ref 8.7–10.5)
CHLORIDE SERPL-SCNC: 108 MMOL/L (ref 95–110)
CHOLEST SERPL-MCNC: 217 MG/DL (ref 120–199)
CHOLEST/HDLC SERPL: 4.7 {RATIO} (ref 2–5)
CLARITY, UA POC OHS: CLEAR
CO2 SERPL-SCNC: 23 MMOL/L (ref 23–29)
COLOR, UA POC OHS: YELLOW
CREAT SERPL-MCNC: 1.1 MG/DL (ref 0.5–1.4)
DIFFERENTIAL METHOD: ABNORMAL
EOSINOPHIL # BLD AUTO: 0.1 K/UL (ref 0–0.5)
EOSINOPHIL NFR BLD: 1.2 % (ref 0–8)
ERYTHROCYTE [DISTWIDTH] IN BLOOD BY AUTOMATED COUNT: 12.4 % (ref 11.5–14.5)
EST. GFR  (NO RACE VARIABLE): 57 ML/MIN/1.73 M^2
GLUCOSE SERPL-MCNC: 95 MG/DL (ref 70–110)
GLUCOSE, UA POC OHS: NEGATIVE
HCT VFR BLD AUTO: 39.7 % (ref 37–48.5)
HDLC SERPL-MCNC: 46 MG/DL (ref 40–75)
HDLC SERPL: 21.2 % (ref 20–50)
HGB BLD-MCNC: 12.5 G/DL (ref 12–16)
IMM GRANULOCYTES # BLD AUTO: 0.02 K/UL (ref 0–0.04)
IMM GRANULOCYTES NFR BLD AUTO: 0.3 % (ref 0–0.5)
IRON SERPL-MCNC: 128 UG/DL (ref 30–160)
KETONES, UA POC OHS: NEGATIVE
LDLC SERPL CALC-MCNC: 137.2 MG/DL (ref 63–159)
LEUKOCYTES, UA POC OHS: NEGATIVE
LYMPHOCYTES # BLD AUTO: 1.6 K/UL (ref 1–4.8)
LYMPHOCYTES NFR BLD: 28.5 % (ref 18–48)
MCH RBC QN AUTO: 28.9 PG (ref 27–31)
MCHC RBC AUTO-ENTMCNC: 31.5 G/DL (ref 32–36)
MCV RBC AUTO: 92 FL (ref 82–98)
MONOCYTES # BLD AUTO: 0.4 K/UL (ref 0.3–1)
MONOCYTES NFR BLD: 7.1 % (ref 4–15)
NEUTROPHILS # BLD AUTO: 3.6 K/UL (ref 1.8–7.7)
NEUTROPHILS NFR BLD: 62.2 % (ref 38–73)
NITRITE, UA POC OHS: NEGATIVE
NONHDLC SERPL-MCNC: 171 MG/DL
NRBC BLD-RTO: 0 /100 WBC
PH, UA POC OHS: 6
PLATELET # BLD AUTO: 309 K/UL (ref 150–450)
PMV BLD AUTO: 9.3 FL (ref 9.2–12.9)
POTASSIUM SERPL-SCNC: 4 MMOL/L (ref 3.5–5.1)
PROT SERPL-MCNC: 7.3 G/DL (ref 6–8.4)
PROTEIN, UA POC OHS: NEGATIVE
RBC # BLD AUTO: 4.33 M/UL (ref 4–5.4)
SATURATED IRON: 35 % (ref 20–50)
SODIUM SERPL-SCNC: 141 MMOL/L (ref 136–145)
SPECIFIC GRAVITY, UA POC OHS: 1.01
TOTAL IRON BINDING CAPACITY: 361 UG/DL (ref 250–450)
TRANSFERRIN SERPL-MCNC: 258 MG/DL (ref 200–375)
TRIGL SERPL-MCNC: 169 MG/DL (ref 30–150)
UROBILINOGEN, UA POC OHS: 0.2
WBC # BLD AUTO: 5.76 K/UL (ref 3.9–12.7)

## 2023-10-05 PROCEDURE — 90686 IIV4 VACC NO PRSV 0.5 ML IM: CPT | Mod: S$GLB,,, | Performed by: FAMILY MEDICINE

## 2023-10-05 PROCEDURE — 81003 POCT URINALYSIS(INSTRUMENT): ICD-10-PCS | Mod: QW,S$GLB,, | Performed by: FAMILY MEDICINE

## 2023-10-05 PROCEDURE — 99999 PR PBB SHADOW E&M-EST. PATIENT-LVL V: CPT | Mod: PBBFAC,,, | Performed by: FAMILY MEDICINE

## 2023-10-05 PROCEDURE — 3074F PR MOST RECENT SYSTOLIC BLOOD PRESSURE < 130 MM HG: ICD-10-PCS | Mod: CPTII,S$GLB,, | Performed by: FAMILY MEDICINE

## 2023-10-05 PROCEDURE — 82784 ASSAY IGA/IGD/IGG/IGM EACH: CPT | Performed by: INTERNAL MEDICINE

## 2023-10-05 PROCEDURE — 90686 FLU VACCINE (QUAD) GREATER THAN OR EQUAL TO 3YO PRESERVATIVE FREE IM: ICD-10-PCS | Mod: S$GLB,,, | Performed by: FAMILY MEDICINE

## 2023-10-05 PROCEDURE — 81003 URINALYSIS AUTO W/O SCOPE: CPT | Mod: QW,S$GLB,, | Performed by: FAMILY MEDICINE

## 2023-10-05 PROCEDURE — 80053 COMPREHEN METABOLIC PANEL: CPT | Performed by: FAMILY MEDICINE

## 2023-10-05 PROCEDURE — 90471 FLU VACCINE (QUAD) GREATER THAN OR EQUAL TO 3YO PRESERVATIVE FREE IM: ICD-10-PCS | Mod: S$GLB,,, | Performed by: FAMILY MEDICINE

## 2023-10-05 PROCEDURE — 1159F MED LIST DOCD IN RCRD: CPT | Mod: CPTII,S$GLB,, | Performed by: FAMILY MEDICINE

## 2023-10-05 PROCEDURE — 90472 PNEUMOCOCCAL CONJUGATE VACCINE 20-VALENT: ICD-10-PCS | Mod: S$GLB,,, | Performed by: FAMILY MEDICINE

## 2023-10-05 PROCEDURE — 82784 ASSAY IGA/IGD/IGG/IGM EACH: CPT | Mod: 91 | Performed by: INTERNAL MEDICINE

## 2023-10-05 PROCEDURE — 1160F RVW MEDS BY RX/DR IN RCRD: CPT | Mod: CPTII,S$GLB,, | Performed by: FAMILY MEDICINE

## 2023-10-05 PROCEDURE — 85025 COMPLETE CBC W/AUTO DIFF WBC: CPT | Performed by: FAMILY MEDICINE

## 2023-10-05 PROCEDURE — 90472 IMMUNIZATION ADMIN EACH ADD: CPT | Mod: S$GLB,,, | Performed by: FAMILY MEDICINE

## 2023-10-05 PROCEDURE — 3074F SYST BP LT 130 MM HG: CPT | Mod: CPTII,S$GLB,, | Performed by: FAMILY MEDICINE

## 2023-10-05 PROCEDURE — 1160F PR REVIEW ALL MEDS BY PRESCRIBER/CLIN PHARMACIST DOCUMENTED: ICD-10-PCS | Mod: CPTII,S$GLB,, | Performed by: FAMILY MEDICINE

## 2023-10-05 PROCEDURE — 3078F DIAST BP <80 MM HG: CPT | Mod: CPTII,S$GLB,, | Performed by: FAMILY MEDICINE

## 2023-10-05 PROCEDURE — 36415 COLL VENOUS BLD VENIPUNCTURE: CPT | Performed by: INTERNAL MEDICINE

## 2023-10-05 PROCEDURE — 99396 PR PREVENTIVE VISIT,EST,40-64: ICD-10-PCS | Mod: 25,S$GLB,, | Performed by: FAMILY MEDICINE

## 2023-10-05 PROCEDURE — 80061 LIPID PANEL: CPT | Performed by: FAMILY MEDICINE

## 2023-10-05 PROCEDURE — 99999 PR PBB SHADOW E&M-EST. PATIENT-LVL V: ICD-10-PCS | Mod: PBBFAC,,, | Performed by: FAMILY MEDICINE

## 2023-10-05 PROCEDURE — 90677 PCV20 VACCINE IM: CPT | Mod: S$GLB,,, | Performed by: FAMILY MEDICINE

## 2023-10-05 PROCEDURE — 3008F BODY MASS INDEX DOCD: CPT | Mod: CPTII,S$GLB,, | Performed by: FAMILY MEDICINE

## 2023-10-05 PROCEDURE — 3008F PR BODY MASS INDEX (BMI) DOCUMENTED: ICD-10-PCS | Mod: CPTII,S$GLB,, | Performed by: FAMILY MEDICINE

## 2023-10-05 PROCEDURE — 84466 ASSAY OF TRANSFERRIN: CPT | Performed by: FAMILY MEDICINE

## 2023-10-05 PROCEDURE — 1159F PR MEDICATION LIST DOCUMENTED IN MEDICAL RECORD: ICD-10-PCS | Mod: CPTII,S$GLB,, | Performed by: FAMILY MEDICINE

## 2023-10-05 PROCEDURE — 90677 PNEUMOCOCCAL CONJUGATE VACCINE 20-VALENT: ICD-10-PCS | Mod: S$GLB,,, | Performed by: FAMILY MEDICINE

## 2023-10-05 PROCEDURE — 90471 IMMUNIZATION ADMIN: CPT | Mod: S$GLB,,, | Performed by: FAMILY MEDICINE

## 2023-10-05 PROCEDURE — 3078F PR MOST RECENT DIASTOLIC BLOOD PRESSURE < 80 MM HG: ICD-10-PCS | Mod: CPTII,S$GLB,, | Performed by: FAMILY MEDICINE

## 2023-10-05 PROCEDURE — 83540 ASSAY OF IRON: CPT | Performed by: FAMILY MEDICINE

## 2023-10-05 PROCEDURE — 99396 PREV VISIT EST AGE 40-64: CPT | Mod: 25,S$GLB,, | Performed by: FAMILY MEDICINE

## 2023-10-05 RX ORDER — OXYBUTYNIN CHLORIDE 5 MG/1
5 TABLET, EXTENDED RELEASE ORAL DAILY
Qty: 30 TABLET | Refills: 11 | Status: SHIPPED | OUTPATIENT
Start: 2023-10-05 | End: 2024-10-04

## 2023-10-05 RX ORDER — RIZATRIPTAN BENZOATE 10 MG/1
TABLET ORAL
Qty: 27 TABLET | Refills: 3 | Status: SHIPPED | OUTPATIENT
Start: 2023-10-05

## 2023-10-05 NOTE — PROGRESS NOTES
Subjective:       Patient ID: Joselyn Hurd is a 62 y.o. female.    Chief Complaint: Annual Exam (Pt states that she is here for her annual exam )    62-year-old female comes in for annual exam.  She has a history of chronic cough with cough variant asthma followed by Dr. Lamb who has put in orders for an IgG, IgM, and pneumococcal immune evaluation to be done with her lab today.  She is fasting.  She has been having urinary frequency and urgency without dysuria and with clear urine.  She has not had a hysterectomy and she has no fever or chills.  She has minimal caffeine intake.  She gets her Pap smears with Dr. Caruso regularly and is still taking Prometrium.  She has a history of migraines on Topamax and Maxalt PRN, the chronic cough with mild persistent asthma on Dulera and Proventil rescue inhaler with Flonase and Singulair.  She has a history of reflux on no medications, irritable bowel syndrome on no medications and chronic sinusitis.  We have been following her for iron deficiency with no active anemia and she is due for a follow-up on that.  She is in need of the Prevnar 20 and the flu vaccine and will take both of those today.    Past Medical History:  No date: GERD (gastroesophageal reflux disease)  No date: IBS (irritable bowel syndrome)  No date: Migraine headache    Past Surgical History:  10/14/11: COLONOSCOPY      Comment:  Dr Ch 2 polyps removed, 5 year recheck  11/09/2016: COLONOSCOPY      Comment:  Dr Ch 5 year swati   No date: DILATION AND CURETTAGE OF UTERUS  No date: TONSILLECTOMY  No date: WISDOM TOOTH EXTRACTION    Review of patient's family history indicates:  Problem: Hypertension      Relation: Mother          Age of Onset: (Not Specified)  Problem: Kidney disease      Relation: Mother          Age of Onset: (Not Specified)  Problem: Colon polyps      Relation: Mother          Age of Onset: (Not Specified)  Problem: Heart disease      Relation: Father          Age of Onset: (Not  Specified)  Problem: Colon cancer      Relation: Father          Age of Onset: (Not Specified)  Problem: Cancer      Relation: Father          Age of Onset: (Not Specified)          Comment: colon/ leukemia   Problem: Hearing loss      Relation: Father          Age of Onset: (Not Specified)  Problem: Cataracts      Relation: Father          Age of Onset: (Not Specified)  Problem: Glaucoma      Relation: Father          Age of Onset: (Not Specified)  Problem: Vision loss      Relation: Father          Age of Onset: (Not Specified)  Problem: Kidney disease      Relation: Father          Age of Onset: (Not Specified)  Problem: Hypertension      Relation: Sister          Age of Onset: (Not Specified)  Problem: ADARSH disease      Relation: Sister          Age of Onset: (Not Specified)  Problem: Asthma      Relation: Sister          Age of Onset: (Not Specified)  Problem: Heart disease      Relation: Sister          Age of Onset: (Not Specified)  Problem: Amblyopia      Relation: Daughter          Age of Onset: (Not Specified)  Problem: Vitiligo      Relation: Daughter          Age of Onset: (Not Specified)  Problem: Depression      Relation: Daughter          Age of Onset: (Not Specified)  Problem: Hypertension      Relation: Maternal Aunt          Age of Onset: (Not Specified)  Problem: Stroke      Relation: Maternal Aunt          Age of Onset: (Not Specified)  Problem: Heart disease      Relation: Maternal Uncle          Age of Onset: (Not Specified)  Problem: Heart disease      Relation: Maternal Grandmother          Age of Onset: (Not Specified)  Problem: Dementia      Relation: Maternal Grandmother          Age of Onset: (Not Specified)  Problem: Alzheimer's disease      Relation: Maternal Grandmother          Age of Onset: (Not Specified)  Problem: Cancer      Relation: Maternal Grandfather          Age of Onset: (Not Specified)          Comment: lung  Problem: Diabetes      Relation: Brother          Age of Onset:  (Not Specified)  Problem: No Known Problems      Relation: Paternal Grandmother          Age of Onset: (Not Specified)  Problem: Cancer      Relation: Paternal Grandfather          Age of Onset: (Not Specified)  Problem: Heart disease      Relation: Paternal Uncle          Age of Onset: (Not Specified)  Problem: Cancer      Relation: Maternal Aunt          Age of Onset: (Not Specified)          Comment: lung, smoker    Social History    Tobacco Use      Smoking status: Never      Smokeless tobacco: Never    Alcohol use: No    Drug use: No    Current Outpatient Medications on File Prior to Visit:  albuterol (PROVENTIL HFA) 90 mcg/actuation inhaler, Inhale 2 puffs into the lungs every 6 (six) hours as needed for Wheezing. Rescue, Disp: 18 g, Rfl: 5  albuterol (PROVENTIL/VENTOLIN HFA) 90 mcg/actuation inhaler, 2 puffs every 4 hours as needed for cough, wheeze, or shortness of breath, Disp: 18 g, Rfl: 11  ascorbic Acid (VITAMIN C) 500 mg CpSR, Take 500 mg by mouth once daily., Disp: , Rfl:   azithromycin (ZITHROMAX) 500 MG tablet, One daily for yellow mucous, repeat if needed, Disp: 3 tablet, Rfl: 3  cetirizine 10 mg Cap, Take by mouth., Disp: , Rfl:   COLLAGEN MISC, by Misc.(Non-Drug; Combo Route) route., Disp: , Rfl:   cyanocobalamin 500 MCG tablet, Take 500 mcg by mouth once daily. Vitamin D, Disp: , Rfl:   docusate sodium (COLACE) 100 MG capsule, Take 100 mg by mouth daily as needed for Constipation., Disp: , Rfl:   fluticasone propionate (FLONASE) 50 mcg/actuation nasal spray, INSTILL 2 SPRAYS IN EACH NOSTRIL ONCE A DAY, Disp: 48 mL, Rfl: 3  hydrocortisone 2.5 % cream, Apply topically 2 (two) times daily as needed., Disp: , Rfl:   mometasone-formoterol (DULERA) 200-5 mcg/actuation inhaler, Inhale 2 puffs into the lungs 2 (two) times daily. Controller, Disp: 13 g, Rfl: 11  montelukast (SINGULAIR) 10 mg tablet, Take 1 tablet (10 mg total) by mouth every evening., Disp: 30 tablet, Rfl: 11  multivitamin (THERAGRAN) per  tablet, Take 1 tablet by mouth once daily. , Disp: , Rfl:   mv-mn/B.coag/B.subtilis/inulin (CULTURELLE PROBIOTIC-MULTIVIT ORAL), Take by mouth., Disp: , Rfl:   omega-3 fatty acids/fish oil (FISH OIL-OMEGA-3 FATTY ACIDS) 300-1,000 mg capsule, Take by mouth once daily., Disp: , Rfl:   predniSONE (DELTASONE) 20 MG tablet, Take one daily for 3 days and may repeat for shortness of breath., Disp: 12 tablet, Rfl: 1  progesterone (PROMETRIUM) 200 MG capsule, , Disp: , Rfl:   topiramate (TOPAMAX) 25 MG tablet, TAKE 2 TABLETS BY MOUTH TWICE A DAY, Disp: 360 tablet, Rfl: 1  TURMERIC ORAL, Take 1,000 mg by mouth., Disp: , Rfl:   UNABLE TO FIND, Pellets-every 4 months, Disp: , Rfl:   vit C/E/Zn/coppr/lutein/zeaxan (PRESERVISION AREDS-2 ORAL), Take by mouth., Disp: , Rfl:   vitamin D (VITAMIN D3) 1000 units Tab, Take 1,000 Units by mouth once daily., Disp: , Rfl:   [DISCONTINUED] rizatriptan (MAXALT) 10 MG tablet, TAKE 1 TABLET BY MOUTH AS NEEDED FOR MIGRAINE MAX 2 PER DAY, Disp: 27 tablet, Rfl: 3    No current facility-administered medications on file prior to visit.          Review of Systems   Constitutional:  Negative for chills, diaphoresis, fatigue, fever and unexpected weight change.   HENT:  Positive for postnasal drip. Negative for congestion, ear pain, hearing loss, sinus pressure and sneezing.    Eyes:  Negative for itching and visual disturbance.   Respiratory:  Negative for cough, chest tightness, shortness of breath and wheezing.    Cardiovascular:  Negative for chest pain, palpitations and leg swelling.   Gastrointestinal:  Negative for abdominal pain, blood in stool, constipation, diarrhea, nausea and vomiting.   Genitourinary:  Positive for frequency and urgency. Negative for dysuria, hematuria, menstrual problem and pelvic pain.   Musculoskeletal:  Negative for arthralgias, back pain, joint swelling and myalgias.   Neurological:  Negative for dizziness and headaches.   Hematological:  Negative for adenopathy.    Psychiatric/Behavioral:  Positive for sleep disturbance (Frequent episodes of insomnia for which she takes Benadryl PRN.  It makes her excessively sleepy the next day and I suggested she try a half of the tablet.  If needed we can use some trazodone.). The patient is not nervous/anxious.        Objective:      Physical Exam  Vitals and nursing note reviewed.   Constitutional:       General: She is not in acute distress.     Appearance: Normal appearance. She is well-developed and normal weight. She is not ill-appearing, toxic-appearing or diaphoretic.      Comments: Good blood pressure control  Normal pulse with regular rhythm  Normal weight with a BMI of 23.2 she is down 5.9 lb from her last physical September 19, 2022   HENT:      Head: Normocephalic and atraumatic.      Right Ear: Tympanic membrane, ear canal and external ear normal. There is no impacted cerumen.      Left Ear: Tympanic membrane, ear canal and external ear normal. There is no impacted cerumen.      Nose: Nose normal. No congestion or rhinorrhea.      Mouth/Throat:      Mouth: Mucous membranes are moist.      Pharynx: Oropharynx is clear. No oropharyngeal exudate or posterior oropharyngeal erythema.   Eyes:      General: No scleral icterus.        Right eye: No discharge.         Left eye: No discharge.      Extraocular Movements: Extraocular movements intact.      Conjunctiva/sclera: Conjunctivae normal.      Pupils: Pupils are equal, round, and reactive to light.   Neck:      Thyroid: No thyromegaly.      Vascular: No carotid bruit or JVD.   Cardiovascular:      Rate and Rhythm: Normal rate and regular rhythm.      Pulses: Normal pulses.      Heart sounds: Normal heart sounds. No murmur heard.     No friction rub. No gallop.   Pulmonary:      Effort: Pulmonary effort is normal. No respiratory distress.      Breath sounds: Normal breath sounds. No stridor. No wheezing, rhonchi or rales.   Chest:      Chest wall: No tenderness.   Abdominal:       General: Bowel sounds are normal. There is no distension.      Palpations: Abdomen is soft. There is no mass.      Tenderness: There is no abdominal tenderness. There is no guarding or rebound.      Hernia: No hernia is present.   Musculoskeletal:         General: No swelling, tenderness, deformity or signs of injury. Normal range of motion.      Cervical back: Normal range of motion and neck supple. No rigidity or tenderness.      Right lower leg: No edema.      Left lower leg: No edema.   Lymphadenopathy:      Cervical: No cervical adenopathy.   Skin:     General: Skin is warm and dry.      Coloration: Skin is not jaundiced or pale.      Findings: No bruising, erythema or rash.   Neurological:      General: No focal deficit present.      Mental Status: She is alert and oriented to person, place, and time. Mental status is at baseline.      Cranial Nerves: No cranial nerve deficit.      Sensory: No sensory deficit.      Motor: No weakness.      Coordination: Coordination normal.      Gait: Gait normal.      Deep Tendon Reflexes: Reflexes are normal and symmetric. Reflexes normal.   Psychiatric:         Mood and Affect: Mood normal.         Behavior: Behavior normal.         Thought Content: Thought content normal.         Judgment: Judgment normal.         Assessment:       1. Encounter for preventive health examination    2. Overactive bladder    3. Mild persistent asthma without complication    4. Cough variant asthma    5. Gastroesophageal reflux disease, unspecified whether esophagitis present    6. Irritable bowel syndrome, unspecified type    7. Iron deficiency    8. Pneumococcal vaccination indicated    9. Migraine without status migrainosus, not intractable, unspecified migraine type    10. BMI 23.0-23.9, adult        Plan:       1. Encounter for preventive health examination  - CBC Auto Differential; Future  - Comprehensive Metabolic Panel; Future  - Lipid Panel; Future    2. Overactive bladder  Urinalysis  is negative for signs of infection and no blood was present.  Will treat for overactive bladder with Ditropan XL 5 mg daily, we can increase to higher dose if needed and if problems with side effects and drowsiness we can try VESIcare but it will be more expensive and perhaps not well covered on insurance  - POCT Urinalysis(Instrument)    3. Mild persistent asthma without complication  Controlled on Dulera and rescue inhaler with Singulair.  We will link Dr. Hurtado immune testing to our lab today    4. Cough variant asthma  See above    5. Gastroesophageal reflux disease, unspecified whether esophagitis present  Asymptomatic    6. Irritable bowel syndrome, unspecified type  Asymptomatic    7. Iron deficiency  Await lab results  - Iron and TIBC; Future  - CBC Auto Differential; Future    8. Pneumococcal vaccination indicated  Given along with regular flu vaccine  - (In Office Administered) Pneumococcal Conjugate Vaccine (20 Valent) (IM) (Preferred)    9. Migraine without status migrainosus, not intractable, unspecified migraine type  Controlled on Topamax with occasional use of Maxalt refill Maxalt  - rizatriptan (MAXALT) 10 MG tablet; TAKE 1 TABLET BY MOUTH AS NEEDED FOR MIGRAINE MAX 2 PER DAY  Dispense: 27 tablet; Refill: 3    10. BMI 23.0-23.9, adult  Good weight no changes needed

## 2023-10-06 LAB
IGG SERPL-MCNC: 816 MG/DL (ref 767–1590)
IGM SERPL-MCNC: 120 MG/DL (ref 37–286)

## 2024-01-09 ENCOUNTER — TELEPHONE (OUTPATIENT)
Dept: PULMONOLOGY | Facility: CLINIC | Age: 64
End: 2024-01-09
Payer: COMMERCIAL

## 2024-01-09 DIAGNOSIS — J45.991 COUGH VARIANT ASTHMA: Primary | ICD-10-CM

## 2024-01-09 RX ORDER — BUDESONIDE AND FORMOTEROL FUMARATE DIHYDRATE 160; 4.5 UG/1; UG/1
2 AEROSOL RESPIRATORY (INHALATION) EVERY 12 HOURS
Qty: 30.6 G | Refills: 3 | Status: SHIPPED | OUTPATIENT
Start: 2024-01-09 | End: 2025-01-08

## 2024-01-09 NOTE — TELEPHONE ENCOUNTER
Spoke to patient.  She said [facility] should be sending something regarding that Dulera is no longer covered by insurance.  Symbicort is now the preferred medication.  Have you rec'd anything from [facility]?  She indicated that they sent a request.

## 2024-01-09 NOTE — TELEPHONE ENCOUNTER
Patient's Dulera is no longer covered.  The preferred medications is Symbicort.  Please see attached RX.

## 2024-03-01 DIAGNOSIS — J45.990 EXERCISE-INDUCED ASTHMA: ICD-10-CM

## 2024-03-01 DIAGNOSIS — R05.3 CHRONIC COUGH: ICD-10-CM

## 2024-03-01 DIAGNOSIS — J45.991 COUGH VARIANT ASTHMA: ICD-10-CM

## 2024-03-01 DIAGNOSIS — J45.30 MILD PERSISTENT ASTHMA WITHOUT COMPLICATION: ICD-10-CM

## 2024-03-03 RX ORDER — MONTELUKAST SODIUM 10 MG/1
10 TABLET ORAL NIGHTLY
Qty: 30 TABLET | Refills: 11 | Status: SHIPPED | OUTPATIENT
Start: 2024-03-03

## 2024-04-28 DIAGNOSIS — G43.909 MIGRAINE WITHOUT STATUS MIGRAINOSUS, NOT INTRACTABLE, UNSPECIFIED MIGRAINE TYPE: ICD-10-CM

## 2024-04-28 NOTE — TELEPHONE ENCOUNTER
No care due was identified.  Health [facility] Embedded Care Due Messages. Reference number: [id number].   4/28/2024 7:07:13 AM CDT

## 2024-04-29 RX ORDER — RIZATRIPTAN BENZOATE 10 MG/1
TABLET ORAL
Qty: 27 TABLET | Refills: 1 | Status: SHIPPED | OUTPATIENT
Start: 2024-04-29

## 2024-04-29 NOTE — TELEPHONE ENCOUNTER
Refill Decision Note   Joselyn Vickey  is requesting a refill authorization.    Brief Assessment and Rationale for Refill:   Approve       Medication Therapy Plan:         Comments:     Note composed:1:31 PM 04/29/2024

## 2024-05-15 DIAGNOSIS — G43.909 MIGRAINE WITHOUT STATUS MIGRAINOSUS, NOT INTRACTABLE, UNSPECIFIED MIGRAINE TYPE: ICD-10-CM

## 2024-05-15 RX ORDER — TOPIRAMATE 25 MG/1
50 TABLET ORAL 2 TIMES DAILY
Qty: 360 TABLET | Refills: 1 | Status: SHIPPED | OUTPATIENT
Start: 2024-05-15

## 2024-05-15 NOTE — TELEPHONE ENCOUNTER
Would she like to switch to the 50 mg Topamax and take fewer pills?  She would take one twice a day

## 2024-05-15 NOTE — TELEPHONE ENCOUNTER
Spoke with patient she would like to stay on the 25 mg tablets.  If she does not need to take two twice a day she does not.

## 2024-05-15 NOTE — TELEPHONE ENCOUNTER
Refill Routing Note   Medication(s) are not appropriate for processing by [facility] for the following reason(s):        Outside of protocol    [facility] action(s):  Route               Appointments  past 12m or future 3m with PCP    Date Provider   Last Visit   10/5/2023 Sebastián Thacker MD   Next Visit   10/7/2024 Sebastián Thacker MD   ED visits in past 90 days: 0        Note composed:8:25 AM 05/15/2024

## 2024-07-21 DIAGNOSIS — G43.909 MIGRAINE WITHOUT STATUS MIGRAINOSUS, NOT INTRACTABLE, UNSPECIFIED MIGRAINE TYPE: ICD-10-CM

## 2024-07-21 RX ORDER — RIZATRIPTAN BENZOATE 10 MG/1
TABLET ORAL
Qty: 18 TABLET | Refills: 0 | Status: SHIPPED | OUTPATIENT
Start: 2024-07-21

## 2024-07-21 NOTE — TELEPHONE ENCOUNTER
No care due was identified.  [facility] Embedded Care Due Messages. Reference number: [id number].   7/21/2024 8:19:17 AM CDT

## 2024-07-24 ENCOUNTER — PATIENT OUTREACH (OUTPATIENT)
Dept: ADMINISTRATIVE | Facility: HOSPITAL | Age: 64
End: 2024-07-24
Payer: COMMERCIAL

## 2024-07-24 NOTE — PROGRESS NOTES
Population Health Chart Review & Patient Outreach Details      Additional [facility] Health Notes:               Updates Requested / Reviewed:      Updated Care Coordination Note, Care Everywhere, , and External Sources: [email or web address]         Health Maintenance Topics Overdue:      VB Score: 1     Cervical Cancer Screening    RSV Vaccine                  Health Maintenance Topic(s) Outreach Outcomes & Actions Taken:    Cervical Cancer Screening - Outreach Outcomes & Actions Taken  : msg

## 2024-07-24 NOTE — LETTER
July 24, 2024    Joselyn Hurd  [address]  [place] [address]             [facility]  [address]  [place] [address]  Phone: [phone] Dear Joselyn,       Our records indicate that you may be overdue for your screening pap smear. We have your most recent 2020 pap smear report.     The current recommendation for a Pap smear screening is every 3 years.      If you recently had your Pap smear screening outside of [facility], please inform them to fax the report to the fax number below.       Sincerely,    Your [facility]  Kristel Baker, Care Coordinator  Phone: [phone]  FAX: [phone]

## 2024-08-07 ENCOUNTER — HOSPITAL ENCOUNTER (OUTPATIENT)
Dept: RADIOLOGY | Facility: HOSPITAL | Age: 64
Discharge: HOME | End: 2024-08-07
Attending: OBSTETRICS & GYNECOLOGY
Payer: COMMERCIAL

## 2024-08-07 DIAGNOSIS — N95.1 MENOPAUSAL STATE: ICD-10-CM

## 2024-08-07 DIAGNOSIS — N95.1 MENOPAUSAL STATE: Primary | ICD-10-CM

## 2024-08-07 LAB
HUMAN PAPILLOMAVIRUS (HPV): NORMAL
PAP RECOMMENDATION EXT: ABNORMAL
PAP SMEAR: ABNORMAL

## 2024-08-07 PROCEDURE — 77080 DXA BONE DENSITY AXIAL: CPT | Mod: TC,PO

## 2024-08-07 PROCEDURE — 77080 DXA BONE DENSITY AXIAL: CPT | Mod: 26,,, | Performed by: RADIOLOGY

## 2024-08-19 DIAGNOSIS — G43.909 MIGRAINE WITHOUT STATUS MIGRAINOSUS, NOT INTRACTABLE, UNSPECIFIED MIGRAINE TYPE: ICD-10-CM

## 2024-08-19 RX ORDER — RIZATRIPTAN BENZOATE 10 MG/1
TABLET ORAL
Qty: 27 TABLET | Refills: 0 | Status: SHIPPED | OUTPATIENT
Start: 2024-08-19

## 2024-08-19 NOTE — TELEPHONE ENCOUNTER
Refill Decision Note   Joselyn Vickey  is requesting a refill authorization.  Brief Assessment and Rationale for Refill:  Approve     Medication Therapy Plan:         Comments:     Note composed:2:13 AM 08/19/2024

## 2024-08-19 NOTE — TELEPHONE ENCOUNTER
No care due was identified.  [facility] Embedded Care Due Messages. Reference number: [id number].   8/19/2024 12:26:39 AM CDT

## 2024-08-29 ENCOUNTER — PATIENT OUTREACH (OUTPATIENT)
Dept: ADMINISTRATIVE | Facility: HOSPITAL | Age: 64
End: 2024-08-29
Payer: COMMERCIAL

## 2024-09-17 DIAGNOSIS — G43.909 MIGRAINE WITHOUT STATUS MIGRAINOSUS, NOT INTRACTABLE, UNSPECIFIED MIGRAINE TYPE: ICD-10-CM

## 2024-09-17 RX ORDER — RIZATRIPTAN BENZOATE 10 MG/1
TABLET ORAL
Qty: 27 TABLET | Refills: 0 | Status: SHIPPED | OUTPATIENT
Start: 2024-09-17

## 2024-09-17 NOTE — TELEPHONE ENCOUNTER
Refill Decision Note   Joselyn Vickey  is requesting a refill authorization.  Brief Assessment and Rationale for Refill:  Approve     Medication Therapy Plan:         Comments:     Note composed:6:04 PM 09/17/2024

## 2024-09-17 NOTE — TELEPHONE ENCOUNTER
No care due was identified.  [facility] Embedded Care Due Messages. Reference number: [id number].   9/17/2024 12:33:56 AM CDT

## 2024-09-18 DIAGNOSIS — Z12.31 OTHER SCREENING MAMMOGRAM: ICD-10-CM

## 2024-09-19 ENCOUNTER — PATIENT OUTREACH (OUTPATIENT)
Dept: ADMINISTRATIVE | Facility: HOSPITAL | Age: 64
End: 2024-09-19
Payer: COMMERCIAL

## 2024-09-19 NOTE — LETTER
September 19, 2024    Joselyn Hurd  [address]  [place] [address]             [facility]  [address]  [place] [address]  Phone: [phone] Dear Kirstie Alves Robert W., MD has entered your screening mammogram order.  You can schedule this Mammogram exam through the link in your [facility] portal, provided on the Appointment Center home page.  Should you need assistance with scheduling, you can call the main line at [phone]. Our scheduling specialists will be able to help you coordinate your appointment.    If you recently had your mammogram screening outside of [facility], please let your primary care team know so that they can update your health record.       Sincerely,        Your [facility]  Kristel Baker, Care Coordinator  Phone: [phone]  FAX: [phone]

## 2024-09-19 NOTE — PROGRESS NOTES
Population Health Chart Review & Patient Outreach Details      Additional [facility] Health Notes:               Updates Requested / Reviewed:        Health Maintenance Topics Overdue:      VBHM Score: 1     Mammogram    Influenza Vaccine  RSV Vaccine                  Health Maintenance Topic(s) Outreach Outcomes & Actions Taken:    Breast Cancer Screening - Outreach Outcomes & Actions Taken  : Mammogram Order Placed

## 2024-09-24 ENCOUNTER — HOSPITAL ENCOUNTER (OUTPATIENT)
Dept: RADIOLOGY | Facility: HOSPITAL | Age: 64
Discharge: HOME | End: 2024-09-24
Attending: FAMILY MEDICINE
Payer: COMMERCIAL

## 2024-09-24 VITALS — BODY MASS INDEX: 23.25 KG/M2 | HEIGHT: 65 IN | WEIGHT: 139.56 LBS

## 2024-09-24 DIAGNOSIS — Z12.31 OTHER SCREENING MAMMOGRAM: ICD-10-CM

## 2024-09-24 PROCEDURE — 77067 SCR MAMMO BI INCL CAD: CPT | Mod: TC,PO

## 2024-09-24 PROCEDURE — 77067 SCR MAMMO BI INCL CAD: CPT | Mod: 26,,, | Performed by: RADIOLOGY

## 2024-09-24 PROCEDURE — 77063 BREAST TOMOSYNTHESIS BI: CPT | Mod: 26,,, | Performed by: RADIOLOGY

## 2024-09-24 PROCEDURE — 77063 BREAST TOMOSYNTHESIS BI: CPT | Mod: TC,PO

## 2024-10-02 DIAGNOSIS — G43.909 MIGRAINE WITHOUT STATUS MIGRAINOSUS, NOT INTRACTABLE, UNSPECIFIED MIGRAINE TYPE: ICD-10-CM

## 2024-10-02 NOTE — TELEPHONE ENCOUNTER
Refill Routing Note   Medication(s) are not appropriate for processing by [facility] for the following reason(s):        No active prescription written by provider    [facility] action(s):  Defer               Appointments  past 12m or future 3m with PCP    Date Provider   Last Visit   Visit date not found Sebastián Thacker MD   Next Visit   11/26/2024 Sebastián Thacker MD   ED visits in past 90 days: 0        Note composed:5:48 PM 10/02/2024

## 2024-10-02 NOTE — TELEPHONE ENCOUNTER
No care due was identified.  Health [facility] Embedded Care Due Messages. Reference number: [id number].   10/02/2024 11:14:42 AM CDT

## 2024-10-04 NOTE — TELEPHONE ENCOUNTER
Refill Routing Note   Medication(s) are not appropriate for processing by [facility] for the following reason(s):        New or recently adjusted medication    [facility] action(s):  Defer               Appointments  past 12m or future 3m with PCP    Date Provider   Last Visit   10/5/2023 Sebastián Thacker MD   Next Visit   11/26/2024 Sebastián Thacker MD   ED visits in past 90 days: 0        Note composed:4:38 PM 10/04/2024

## 2024-10-09 RX ORDER — RIZATRIPTAN BENZOATE 10 MG/1
TABLET ORAL
Qty: 27 TABLET | Refills: 0 | Status: SHIPPED | OUTPATIENT
Start: 2024-10-09

## 2024-10-30 DIAGNOSIS — N32.81 OVERACTIVE BLADDER: ICD-10-CM

## 2024-10-30 RX ORDER — OXYBUTYNIN CHLORIDE 5 MG/1
5 TABLET, EXTENDED RELEASE ORAL DAILY
Qty: 90 TABLET | Refills: 0 | Status: SHIPPED | OUTPATIENT
Start: 2024-10-30

## 2024-11-06 DIAGNOSIS — Z00.00 ENCOUNTER FOR PREVENTIVE HEALTH EXAMINATION: Primary | ICD-10-CM

## 2024-11-07 ENCOUNTER — LAB VISIT (OUTPATIENT)
Dept: LAB | Facility: HOSPITAL | Age: 64
End: 2024-11-07
Attending: FAMILY MEDICINE
Payer: COMMERCIAL

## 2024-11-07 DIAGNOSIS — Z00.00 ENCOUNTER FOR PREVENTIVE HEALTH EXAMINATION: ICD-10-CM

## 2024-11-07 LAB
ALBUMIN SERPL BCP-MCNC: 4.5 G/DL (ref 3.5–5.2)
ALP SERPL-CCNC: 42 U/L (ref 55–135)
ALT SERPL W/O P-5'-P-CCNC: 12 U/L (ref 10–44)
ANION GAP SERPL CALC-SCNC: 7 MMOL/L (ref 8–16)
AST SERPL-CCNC: 12 U/L (ref 10–40)
BASOPHILS # BLD AUTO: 0.04 K/UL (ref 0–0.2)
BASOPHILS NFR BLD: 0.7 % (ref 0–1.9)
BILIRUB SERPL-MCNC: 0.3 MG/DL (ref 0.1–1)
BUN SERPL-MCNC: 19 MG/DL (ref 8–23)
CALCIUM SERPL-MCNC: 9.2 MG/DL (ref 8.7–10.5)
CHLORIDE SERPL-SCNC: 106 MMOL/L (ref 95–110)
CHOLEST SERPL-MCNC: 191 MG/DL (ref 120–199)
CHOLEST/HDLC SERPL: 3.8 {RATIO} (ref 2–5)
CO2 SERPL-SCNC: 27 MMOL/L (ref 23–29)
CREAT SERPL-MCNC: 1.2 MG/DL (ref 0.5–1.4)
DIFFERENTIAL METHOD BLD: NORMAL
EOSINOPHIL # BLD AUTO: 0.2 K/UL (ref 0–0.5)
EOSINOPHIL NFR BLD: 3.2 % (ref 0–8)
ERYTHROCYTE [DISTWIDTH] IN BLOOD BY AUTOMATED COUNT: 12.6 % (ref 11.5–14.5)
EST. GFR  (NO RACE VARIABLE): 51.2 ML/MIN/1.73 M^2
GLUCOSE SERPL-MCNC: 94 MG/DL (ref 70–110)
HCT VFR BLD AUTO: 37.2 % (ref 37–48.5)
HDLC SERPL-MCNC: 50 MG/DL (ref 40–75)
HDLC SERPL: 26.2 % (ref 20–50)
HGB BLD-MCNC: 12.2 G/DL (ref 12–16)
IMM GRANULOCYTES # BLD AUTO: 0.01 K/UL (ref 0–0.04)
IMM GRANULOCYTES NFR BLD AUTO: 0.2 % (ref 0–0.5)
LDLC SERPL CALC-MCNC: 117.6 MG/DL (ref 63–159)
LYMPHOCYTES # BLD AUTO: 1.8 K/UL (ref 1–4.8)
LYMPHOCYTES NFR BLD: 31.8 % (ref 18–48)
MCH RBC QN AUTO: 29.5 PG (ref 27–31)
MCHC RBC AUTO-ENTMCNC: 32.8 G/DL (ref 32–36)
MCV RBC AUTO: 90 FL (ref 82–98)
MONOCYTES # BLD AUTO: 0.4 K/UL (ref 0.3–1)
MONOCYTES NFR BLD: 7.6 % (ref 4–15)
NEUTROPHILS # BLD AUTO: 3.2 K/UL (ref 1.8–7.7)
NEUTROPHILS NFR BLD: 56.5 % (ref 38–73)
NONHDLC SERPL-MCNC: 141 MG/DL
NRBC BLD-RTO: 0 /100 WBC
PLATELET # BLD AUTO: 294 K/UL (ref 150–450)
PMV BLD AUTO: 9.3 FL (ref 9.2–12.9)
POTASSIUM SERPL-SCNC: 4.3 MMOL/L (ref 3.5–5.1)
PROT SERPL-MCNC: 6.9 G/DL (ref 6–8.4)
RBC # BLD AUTO: 4.13 M/UL (ref 4–5.4)
SODIUM SERPL-SCNC: 140 MMOL/L (ref 136–145)
TRIGL SERPL-MCNC: 117 MG/DL (ref 30–150)
WBC # BLD AUTO: 5.63 K/UL (ref 3.9–12.7)

## 2024-11-07 PROCEDURE — 85025 COMPLETE CBC W/AUTO DIFF WBC: CPT | Performed by: FAMILY MEDICINE

## 2024-11-07 PROCEDURE — 80061 LIPID PANEL: CPT | Performed by: FAMILY MEDICINE

## 2024-11-07 PROCEDURE — 80053 COMPREHEN METABOLIC PANEL: CPT | Performed by: FAMILY MEDICINE

## 2024-11-07 PROCEDURE — 36415 COLL VENOUS BLD VENIPUNCTURE: CPT | Performed by: FAMILY MEDICINE

## 2024-11-09 DIAGNOSIS — G43.909 MIGRAINE WITHOUT STATUS MIGRAINOSUS, NOT INTRACTABLE, UNSPECIFIED MIGRAINE TYPE: ICD-10-CM

## 2024-11-09 NOTE — TELEPHONE ENCOUNTER
No care due was identified.  [facility] [facility] Embedded Care Due Messages. Reference number: [id number].   11/09/2024 9:53:08 AM CST

## 2024-11-09 NOTE — TELEPHONE ENCOUNTER
Refill Routing Note   Medication(s) are not appropriate for processing by [facility] for the following reason(s):        Required vitals outdated    [facility] action(s):  Defer               Appointments  past 12m or future 3m with PCP    Date Provider   Last Visit   10/5/2023 Sebastián Thacker MD   Next Visit   11/26/2024 Sebastián Thacker MD   ED visits in past 90 days: 0        Note composed:4:24 PM 11/09/2024

## 2024-11-11 RX ORDER — RIZATRIPTAN BENZOATE 10 MG/1
TABLET ORAL
Qty: 27 TABLET | Refills: 3 | Status: SHIPPED | OUTPATIENT
Start: 2024-11-11

## 2024-11-15 DIAGNOSIS — G43.909 MIGRAINE WITHOUT STATUS MIGRAINOSUS, NOT INTRACTABLE, UNSPECIFIED MIGRAINE TYPE: ICD-10-CM

## 2024-11-15 RX ORDER — TOPIRAMATE 25 MG/1
50 TABLET ORAL 2 TIMES DAILY
Qty: 360 TABLET | Refills: 1 | Status: SHIPPED | OUTPATIENT
Start: 2024-11-15

## 2024-11-26 ENCOUNTER — OFFICE VISIT (OUTPATIENT)
Dept: FAMILY MEDICINE | Facility: CLINIC | Age: 64
End: 2024-11-26
Attending: FAMILY MEDICINE
Payer: COMMERCIAL

## 2024-11-26 VITALS
WEIGHT: 141.19 LBS | SYSTOLIC BLOOD PRESSURE: 112 MMHG | BODY MASS INDEX: 23.5 KG/M2 | OXYGEN SATURATION: 99 % | TEMPERATURE: 99 F | DIASTOLIC BLOOD PRESSURE: 68 MMHG | HEART RATE: 79 BPM

## 2024-11-26 DIAGNOSIS — F40.243 ANXIETY WITH FLYING: ICD-10-CM

## 2024-11-26 DIAGNOSIS — Z00.00 ENCOUNTER FOR PREVENTIVE HEALTH EXAMINATION: Primary | ICD-10-CM

## 2024-11-26 DIAGNOSIS — N18.31 CHRONIC KIDNEY DISEASE, STAGE 3A: ICD-10-CM

## 2024-11-26 DIAGNOSIS — N32.81 OVERACTIVE BLADDER: ICD-10-CM

## 2024-11-26 DIAGNOSIS — J45.991 COUGH VARIANT ASTHMA: ICD-10-CM

## 2024-11-26 DIAGNOSIS — T75.3XXA MOTION SICKNESS, INITIAL ENCOUNTER: ICD-10-CM

## 2024-11-26 DIAGNOSIS — K21.9 GASTROESOPHAGEAL REFLUX DISEASE, UNSPECIFIED WHETHER ESOPHAGITIS PRESENT: ICD-10-CM

## 2024-11-26 DIAGNOSIS — R05.3 CHRONIC COUGH: ICD-10-CM

## 2024-11-26 DIAGNOSIS — J45.990 EXERCISE-INDUCED ASTHMA: ICD-10-CM

## 2024-11-26 DIAGNOSIS — J45.30 MILD PERSISTENT ASTHMA WITHOUT COMPLICATION: ICD-10-CM

## 2024-11-26 PROCEDURE — 3008F BODY MASS INDEX DOCD: CPT | Mod: CPTII,S$GLB,, | Performed by: FAMILY MEDICINE

## 2024-11-26 PROCEDURE — 1160F RVW MEDS BY RX/DR IN RCRD: CPT | Mod: CPTII,S$GLB,, | Performed by: FAMILY MEDICINE

## 2024-11-26 PROCEDURE — 3078F DIAST BP <80 MM HG: CPT | Mod: CPTII,S$GLB,, | Performed by: FAMILY MEDICINE

## 2024-11-26 PROCEDURE — 3074F SYST BP LT 130 MM HG: CPT | Mod: CPTII,S$GLB,, | Performed by: FAMILY MEDICINE

## 2024-11-26 PROCEDURE — 1159F MED LIST DOCD IN RCRD: CPT | Mod: CPTII,S$GLB,, | Performed by: FAMILY MEDICINE

## 2024-11-26 PROCEDURE — 99396 PREV VISIT EST AGE 40-64: CPT | Mod: S$GLB,,, | Performed by: FAMILY MEDICINE

## 2024-11-26 PROCEDURE — 99999 PR PBB SHADOW E&M-EST. PATIENT-LVL V: CPT | Mod: PBBFAC,,, | Performed by: FAMILY MEDICINE

## 2024-11-26 RX ORDER — ALBUTEROL SULFATE 90 UG/1
INHALANT RESPIRATORY (INHALATION)
Qty: 18 G | Refills: 11 | Status: SHIPPED | OUTPATIENT
Start: 2024-11-26

## 2024-11-26 RX ORDER — OXYBUTYNIN CHLORIDE 10 MG/1
10 TABLET, EXTENDED RELEASE ORAL DAILY
Qty: 90 TABLET | Refills: 3 | Status: SHIPPED | OUTPATIENT
Start: 2024-11-26

## 2024-11-26 RX ORDER — ALPRAZOLAM 0.25 MG/1
TABLET ORAL
Qty: 20 TABLET | Refills: 0 | Status: SHIPPED | OUTPATIENT
Start: 2024-11-26

## 2024-11-26 RX ORDER — SCOLOPAMINE TRANSDERMAL SYSTEM 1 MG/1
PATCH, EXTENDED RELEASE TRANSDERMAL
Qty: 8 PATCH | Refills: 1 | Status: SHIPPED | OUTPATIENT
Start: 2024-11-26

## 2024-11-26 NOTE — PROGRESS NOTES
Subjective:       Patient ID: Joselyn Hurd is a 63 y.o. female.    Chief Complaint: Annual Exam    63-year-old female coming in for annual exam.  Her lab was done prior to the visit and the results were satisfactory with a slight decrease in her GFR over her last two labs suggesting stage IIIA chronic kidney disease.  She maintains good hydration and has limited amounts of caffeine intake with very little alcohol.  She is not using any anti-inflammatories and has not had any recent urinary infection symptoms.  She is going to be taking a [place] cruise in March with a long flight before and after of more than 8 hours.  She has problems with seasickness and she has anxiety regarding flying.  She would like some of the skin patches for the cruise which will last 14 full days and she would like something to help calm her down for the flight on both ends.   was checked and was completely empty, no previous use of any controlled drugs in the last 18 months.  She is using oxybutynin extended-release 5 mg for irritable bladder and it somewhat helpful but not sufficient.  She would like to try going up to the 10 mg.  She is not having any trouble with drowsiness, dry mouth or other problems on the 5 mg.  She has been having problems with waking up coughing at night sometimes severely and on one of those occasions she knows it was after eating pizza that evening.  She does have a history of reflux and she uses over-the-counter Tagamet on an as-needed basis.  She does not have an awareness of heartburn.  She has some exercise induced asthma and needs a refill of her PRN rescue inhaler.      Past Medical History:  No date: GERD (gastroesophageal reflux disease)  No date: IBS (irritable bowel syndrome)  No date: Migraine headache     Past Surgical History:  10/14/11: COLONOSCOPY      Comment:  Dr Ch 2 polyps removed, 5 year recheck  11/09/2016: COLONOSCOPY      Comment:  Dr Ch 5 year swati   No date: DILATION  AND CURETTAGE OF UTERUS  No date: TONSILLECTOMY  No date: WISDOM TOOTH EXTRACTION    Review of patient's family history indicates:  Problem: Hypertension      Relation: Mother          Name:               Age of Onset: (Not Specified)  Problem: Kidney disease      Relation: Mother          Name:               Age of Onset: (Not Specified)  Problem: Colon polyps      Relation: Mother          Name:               Age of Onset: (Not Specified)  Problem: Heart disease      Relation: Father          Name:               Age of Onset: (Not Specified)  Problem: Colon cancer      Relation: Father          Name:               Age of Onset: (Not Specified)  Problem: Cancer      Relation: Father          Name:               Age of Onset: (Not Specified)              Comment: colon/ leukemia   Problem: Hearing loss      Relation: Father          Name:               Age of Onset: (Not Specified)  Problem: Cataracts      Relation: Father          Name:               Age of Onset: (Not Specified)  Problem: Glaucoma      Relation: Father          Name:               Age of Onset: (Not Specified)  Problem: Vision loss      Relation: Father          Name:               Age of Onset: (Not Specified)  Problem: Kidney disease      Relation: Father          Name:               Age of Onset: (Not Specified)  Problem: Hypertension      Relation: Sister          Name:               Age of Onset: (Not Specified)  Problem: ADARSH disease      Relation: Sister          Name:               Age of Onset: (Not Specified)  Problem: Asthma      Relation: Sister          Name:               Age of Onset: (Not Specified)  Problem: Heart disease      Relation: Sister          Name:               Age of Onset: (Not Specified)  Problem: Amblyopia      Relation: Daughter          Name:               Age of Onset: (Not Specified)  Problem: Vitiligo      Relation: Daughter          Name:               Age of Onset: (Not Specified)  Problem: Depression       Relation: Daughter          Name:               Age of Onset: (Not Specified)  Problem: Hypertension      Relation: Maternal Aunt          Name:               Age of Onset: (Not Specified)  Problem: Stroke      Relation: Maternal Aunt          Name:               Age of Onset: (Not Specified)  Problem: Heart disease      Relation: Maternal Uncle          Name:               Age of Onset: (Not Specified)  Problem: Heart disease      Relation: Maternal Grandmother          Name:               Age of Onset: (Not Specified)  Problem: Dementia      Relation: Maternal Grandmother          Name:               Age of Onset: (Not Specified)  Problem: Alzheimer's disease      Relation: Maternal Grandmother          Name:               Age of Onset: (Not Specified)  Problem: Cancer      Relation: Maternal Grandfather          Name:               Age of Onset: (Not Specified)              Comment: lung  Problem: Diabetes      Relation: Brother          Name:               Age of Onset: (Not Specified)  Problem: No Known Problems      Relation: Paternal Grandmother          Name:               Age of Onset: (Not Specified)  Problem: Cancer      Relation: Paternal Grandfather          Name:               Age of Onset: (Not Specified)  Problem: Heart disease      Relation: Paternal Uncle          Name:               Age of Onset: (Not Specified)  Problem: Cancer      Relation: Maternal Aunt          Name:               Age of Onset: (Not Specified)              Comment: lung, smoker    Social History    Tobacco Use      Smoking status: Never      Smokeless tobacco: Never    Alcohol use: No    Drug use: No    Current Outpatient Medications on File Prior to Visit:  ascorbic Acid (VITAMIN C) 500 mg CpSR, Take 500 mg by mouth once daily., Disp: , Rfl:   cetirizine 10 mg Cap, Take by mouth., Disp: , Rfl:   COLLAGEN MISC, by Misc.(Non-Drug; Combo Route) route., Disp: , Rfl:   cyanocobalamin 500 MCG tablet, Take 500 mcg by mouth once  daily. Vitamin D, Disp: , Rfl:   docusate sodium (COLACE) 100 MG capsule, Take 100 mg by mouth daily as needed for Constipation., Disp: , Rfl:   fluticasone propionate (FLONASE) 50 mcg/actuation nasal spray, INSTILL 2 SPRAYS IN EACH NOSTRIL ONCE A DAY, Disp: 48 mL, Rfl: 3  montelukast (SINGULAIR) 10 mg tablet, Take 1 tablet (10 mg total) by mouth every evening., Disp: 30 tablet, Rfl: 11  multivitamin (THERAGRAN) per tablet, Take 1 tablet by mouth once daily. , Disp: , Rfl:   mv-mn/B.coag/B.subtilis/inulin (CULTURELLE PROBIOTIC-MULTIVIT ORAL), Take by mouth., Disp: , Rfl:   progesterone (PROMETRIUM) 200 MG capsule, , Disp: , Rfl:   rizatriptan (MAXALT) 10 MG tablet, TAKE 1 TABLET BY MOUTH AS NEEDED FOR MIGRAINE MAX 2 PER DAY, Disp: 27 tablet, Rfl: 3  topiramate (TOPAMAX) 25 MG tablet, TAKE 2 TABLETS BY MOUTH TWICE A DAY, Disp: 360 tablet, Rfl: 1  UNABLE TO FIND, Pellets-every 4 months, Disp: , Rfl:   vit C/E/Zn/coppr/lutein/zeaxan (PRESERVISION AREDS-2 ORAL), Take by mouth., Disp: , Rfl:   vitamin D (VITAMIN D3) 1000 units Tab, Take 1,000 Units by mouth once daily., Disp: , Rfl:   [DISCONTINUED] albuterol (PROVENTIL/VENTOLIN HFA) 90 mcg/actuation inhaler, 2 puffs every 4 hours as needed for cough, wheeze, or shortness of breath, Disp: 18 g, Rfl: 11  [DISCONTINUED] oxybutynin (DITROPAN-XL) 5 MG TR24, Take 1 tablet (5 mg total) by mouth once daily., Disp: 90 tablet, Rfl: 0  azithromycin (ZITHROMAX) 500 MG tablet, One daily for yellow mucous, repeat if needed (Patient not taking: Reported on 11/26/2024), Disp: 3 tablet, Rfl: 3  budesonide-formoterol 160-4.5 mcg (SYMBICORT) 160-4.5 mcg/actuation HFAA, Inhale 2 puffs into the lungs every 12 (twelve) hours. Controller (Patient not taking: Reported on 11/26/2024), Disp: 30.6 g, Rfl: 3  hydrocortisone 2.5 % cream, Apply topically 2 (two) times daily as needed. (Patient not taking: Reported on 11/26/2024), Disp: , Rfl:   mometasone-formoterol (DULERA) 200-5 mcg/actuation  inhaler, Inhale 2 puffs into the lungs 2 (two) times daily. Controller (Patient not taking: Reported on 11/26/2024), Disp: 13 g, Rfl: 11  omega-3 fatty acids/fish oil (FISH OIL-OMEGA-3 FATTY ACIDS) 300-1,000 mg capsule, Take by mouth once daily. (Patient not taking: Reported on 11/26/2024), Disp: , Rfl:   predniSONE (DELTASONE) 20 MG tablet, Take one daily for 3 days and may repeat for shortness of breath. (Patient not taking: Reported on 11/26/2024), Disp: 12 tablet, Rfl: 1  TURMERIC ORAL, Take 1,000 mg by mouth. (Patient not taking: Reported on 11/26/2024), Disp: , Rfl:     No current facility-administered medications on file prior to visit.          Review of Systems   Constitutional:  Negative for chills, diaphoresis, fatigue, fever and unexpected weight change.   HENT:  Negative for congestion, ear pain, hearing loss, postnasal drip, sinus pressure, sneezing, sore throat, tinnitus and trouble swallowing.    Eyes:  Negative for itching and visual disturbance.   Respiratory:  Positive for cough (Frequently nocturnal and probably related to reflux) and shortness of breath (With exercise induced asthma). Negative for chest tightness and wheezing.    Cardiovascular:  Negative for chest pain, palpitations and leg swelling.   Gastrointestinal:  Negative for abdominal pain, blood in stool, constipation, diarrhea, nausea and vomiting.   Genitourinary:  Negative for dysuria, frequency and hematuria.        She had her Pap smear and DEXA scan recently with Dr. Caruso   Musculoskeletal:  Negative for arthralgias, back pain, joint swelling and myalgias.   Neurological:  Negative for dizziness and headaches.   Hematological:  Negative for adenopathy.   Psychiatric/Behavioral:  Negative for sleep disturbance. The patient is not nervous/anxious.        Objective:      Physical Exam  Vitals and nursing note reviewed.   Constitutional:       General: She is not in acute distress.     Appearance: Normal appearance. She is  well-developed and normal weight. She is not ill-appearing, toxic-appearing or diaphoretic.      Comments: Good blood pressure control   Normal pulse with regular rhythm   Normal weight with a BMI of 23.5 she is up 1.7 lb from her last physical October 5, 2023   HENT:      Head: Normocephalic and atraumatic.      Right Ear: Tympanic membrane, ear canal and external ear normal. There is no impacted cerumen.      Left Ear: Tympanic membrane, ear canal and external ear normal. There is no impacted cerumen.      Nose: Nose normal. No congestion or rhinorrhea.      Mouth/Throat:      Mouth: Mucous membranes are moist.      Pharynx: Oropharynx is clear. No oropharyngeal exudate or posterior oropharyngeal erythema.   Eyes:      General: No scleral icterus.        Right eye: No discharge.         Left eye: No discharge.      Extraocular Movements: Extraocular movements intact.      Conjunctiva/sclera: Conjunctivae normal.      Pupils: Pupils are equal, round, and reactive to light.   Neck:      Thyroid: No thyromegaly.      Vascular: No carotid bruit or JVD.   Cardiovascular:      Rate and Rhythm: Normal rate and regular rhythm.      Pulses: Normal pulses.      Heart sounds: Normal heart sounds. No murmur heard.     No friction rub. No gallop.   Pulmonary:      Effort: Pulmonary effort is normal. No respiratory distress.      Breath sounds: Normal breath sounds. No stridor. No wheezing, rhonchi or rales.   Chest:      Chest wall: No tenderness.   Abdominal:      General: Bowel sounds are normal. There is no distension.      Palpations: Abdomen is soft. There is no mass.      Tenderness: There is no abdominal tenderness. There is no guarding or rebound.      Hernia: No hernia is present.   Musculoskeletal:         General: No swelling, tenderness, deformity or signs of injury. Normal range of motion.      Cervical back: Normal range of motion and neck supple. No rigidity or tenderness.      Right lower leg: No edema.       Left lower leg: No edema.   Lymphadenopathy:      Cervical: No cervical adenopathy.   Skin:     General: Skin is warm and dry.      Coloration: Skin is not jaundiced or pale.      Findings: No bruising, erythema, lesion or rash.   Neurological:      General: No focal deficit present.      Mental Status: She is alert and oriented to person, place, and time. Mental status is at baseline.      Cranial Nerves: No cranial nerve deficit.      Sensory: No sensory deficit.      Motor: No weakness.      Coordination: Coordination normal.      Gait: Gait normal.      Deep Tendon Reflexes: Reflexes are normal and symmetric. Reflexes normal.   Psychiatric:         Mood and Affect: Mood normal.         Behavior: Behavior normal.         Thought Content: Thought content normal.         Judgment: Judgment normal.         Assessment:       1. Encounter for preventive health examination    2. Anxiety with flying    3. Motion sickness, initial encounter    4. Overactive bladder    5. Chronic cough    6. Mild persistent asthma without complication    7. Cough variant asthma    8. Exercise-induced asthma    9. Gastroesophageal reflux disease, unspecified whether esophagitis present    10. Chronic kidney disease, stage 3a    11. BMI 23.0-23.9, adult        Plan:       1. Encounter for preventive health examination (Primary)    2. Anxiety with flying   checked with no inappropriate activity.  - ALPRAZolam (XANAX) 0.25 MG tablet; 1-2 up to three times a day as needed for anxiety flying  Dispense: 20 tablet; Refill: 0    3. Motion sickness, initial encounter  - scopolamine (TRANSDERM-SCOP) 1.3-1.5 mg (1 mg over 3 days); Apply four hours before sailing, change every three days, remove four hours after final return to port  Dispense: 8 patch; Refill: 1    4. Overactive bladder  Increase oxybutynin to 10 mg daily, cautioned that it may also relax the gastroesophageal sphincter and increase reflux symptoms  - oxybutynin (DITROPAN-XL) 10 MG  24 hr tablet; Take 1 tablet (10 mg total) by mouth once daily.  Dispense: 90 tablet; Refill: 3    5. Chronic cough  Suspect related to reflux.  Discussed anti-reflux measures such as raising the head of the bed 6 to 7 in, avoiding anything to eat or drink within 2 hours of bedtime, avoiding tomatoes, cucumbers, and peppermint  - albuterol (PROVENTIL/VENTOLIN HFA) 90 mcg/actuation inhaler; 2 puffs every 4 hours as needed for cough, wheeze, or shortness of breath  Dispense: 18 g; Refill: 11    6. Mild persistent asthma without complication  Asymptomatic at present, refill rescue inhaler  - albuterol (PROVENTIL/VENTOLIN HFA) 90 mcg/actuation inhaler; 2 puffs every 4 hours as needed for cough, wheeze, or shortness of breath  Dispense: 18 g; Refill: 11    7. Cough variant asthma  See above  - albuterol (PROVENTIL/VENTOLIN HFA) 90 mcg/actuation inhaler; 2 puffs every 4 hours as needed for cough, wheeze, or shortness of breath  Dispense: 18 g; Refill: 11    8. Exercise-induced asthma  See above  - albuterol (PROVENTIL/VENTOLIN HFA) 90 mcg/actuation inhaler; 2 puffs every 4 hours as needed for cough, wheeze, or shortness of breath  Dispense: 18 g; Refill: 11    9. Gastroesophageal reflux disease, unspecified whether esophagitis present  See above    10. Chronic kidney disease, stage 3a  BMP  Lab Results   Component Value Date     11/07/2024    K 4.3 11/07/2024     11/07/2024    CO2 27 11/07/2024    BUN 19 11/07/2024    CREATININE 1.2 11/07/2024    CALCIUM 9.2 11/07/2024    ANIONGAP 7 (L) 11/07/2024    EGFRNORACEVR 51.2 (A) 11/07/2024     Try to maintain good hydration, avoid anti inflammatories as much as possible.  Try to avoid caffeine and theobromine found in tea and chocolate.  Discussed doing an ultrasound of the kidneys to check for abnormalities but she declined at this time    11. BMI 23.0-23.9, adult  Good weight for age, no changes needed

## 2025-04-08 ENCOUNTER — OFFICE VISIT (OUTPATIENT)
Dept: FAMILY MEDICINE | Facility: CLINIC | Age: 65
End: 2025-04-08
Payer: COMMERCIAL

## 2025-04-08 VITALS
TEMPERATURE: 99 F | BODY MASS INDEX: 22.99 KG/M2 | HEIGHT: 65 IN | SYSTOLIC BLOOD PRESSURE: 128 MMHG | WEIGHT: 138 LBS | DIASTOLIC BLOOD PRESSURE: 64 MMHG | OXYGEN SATURATION: 97 % | HEART RATE: 86 BPM

## 2025-04-08 DIAGNOSIS — Z11.4 SCREENING FOR HIV (HUMAN IMMUNODEFICIENCY VIRUS): ICD-10-CM

## 2025-04-08 DIAGNOSIS — N18.31 CHRONIC KIDNEY DISEASE, STAGE 3A: ICD-10-CM

## 2025-04-08 DIAGNOSIS — N32.81 OVERACTIVE BLADDER: ICD-10-CM

## 2025-04-08 DIAGNOSIS — G43.909 MIGRAINE WITHOUT STATUS MIGRAINOSUS, NOT INTRACTABLE, UNSPECIFIED MIGRAINE TYPE: Primary | ICD-10-CM

## 2025-04-08 DIAGNOSIS — J30.9 CHRONIC ALLERGIC RHINITIS: ICD-10-CM

## 2025-04-08 PROCEDURE — 99999 PR PBB SHADOW E&M-EST. PATIENT-LVL IV: CPT | Mod: PBBFAC,,, | Performed by: STUDENT IN AN ORGANIZED HEALTH CARE EDUCATION/TRAINING PROGRAM

## 2025-04-08 PROCEDURE — 1159F MED LIST DOCD IN RCRD: CPT | Mod: CPTII,S$GLB,, | Performed by: STUDENT IN AN ORGANIZED HEALTH CARE EDUCATION/TRAINING PROGRAM

## 2025-04-08 PROCEDURE — 99214 OFFICE O/P EST MOD 30 MIN: CPT | Mod: S$GLB,,, | Performed by: STUDENT IN AN ORGANIZED HEALTH CARE EDUCATION/TRAINING PROGRAM

## 2025-04-08 PROCEDURE — 3074F SYST BP LT 130 MM HG: CPT | Mod: CPTII,S$GLB,, | Performed by: STUDENT IN AN ORGANIZED HEALTH CARE EDUCATION/TRAINING PROGRAM

## 2025-04-08 PROCEDURE — 3008F BODY MASS INDEX DOCD: CPT | Mod: CPTII,S$GLB,, | Performed by: STUDENT IN AN ORGANIZED HEALTH CARE EDUCATION/TRAINING PROGRAM

## 2025-04-08 PROCEDURE — G2211 COMPLEX E/M VISIT ADD ON: HCPCS | Mod: S$GLB,,, | Performed by: STUDENT IN AN ORGANIZED HEALTH CARE EDUCATION/TRAINING PROGRAM

## 2025-04-08 PROCEDURE — 1160F RVW MEDS BY RX/DR IN RCRD: CPT | Mod: CPTII,S$GLB,, | Performed by: STUDENT IN AN ORGANIZED HEALTH CARE EDUCATION/TRAINING PROGRAM

## 2025-04-08 PROCEDURE — 3078F DIAST BP <80 MM HG: CPT | Mod: CPTII,S$GLB,, | Performed by: STUDENT IN AN ORGANIZED HEALTH CARE EDUCATION/TRAINING PROGRAM

## 2025-04-08 RX ORDER — TOPIRAMATE 25 MG/1
50 TABLET ORAL 2 TIMES DAILY
Qty: 360 TABLET | Refills: 1 | Status: SHIPPED | OUTPATIENT
Start: 2025-04-08

## 2025-04-08 RX ORDER — RIZATRIPTAN BENZOATE 10 MG/1
TABLET ORAL
Qty: 27 TABLET | Refills: 3 | Status: SHIPPED | OUTPATIENT
Start: 2025-04-08

## 2025-04-08 RX ORDER — METRONIDAZOLE 7.5 MG/G
CREAM TOPICAL 2 TIMES DAILY
COMMUNITY

## 2025-04-08 RX ORDER — SODIUM SULFACETAMIDE AND SULFUR 100; 50 MG/G; MG/G
CREAM TOPICAL DAILY
COMMUNITY
Start: 2025-03-12

## 2025-04-08 RX ORDER — SPIRONOLACTONE 50 MG/1
50 TABLET, FILM COATED ORAL DAILY
COMMUNITY
Start: 2025-03-25

## 2025-04-08 RX ORDER — OXYBUTYNIN CHLORIDE 10 MG/1
10 TABLET, EXTENDED RELEASE ORAL DAILY
Qty: 90 TABLET | Refills: 3 | Status: SHIPPED | OUTPATIENT
Start: 2025-04-08

## 2025-04-09 PROBLEM — N32.81 OVERACTIVE BLADDER: Status: ACTIVE | Noted: 2025-04-09

## 2025-04-09 PROBLEM — J30.9 CHRONIC ALLERGIC RHINITIS: Status: ACTIVE | Noted: 2025-04-09

## 2025-04-09 NOTE — PROGRESS NOTES
Subjective      Joselyn Hurd is a 64 y.o. female        Chief Complaint   Patient presents with    [facility] Care        HPI     Patient presents today for Chronic Care Management follow up.         Problem Noted   Chronic Allergic Rhinitis 4/9/2025    Patient with chronic allergic rhinitis.  She reports compliance with Flonase and Singulair for this concern, which is controlling her symptoms well.     Overactive Bladder 4/9/2025    Patient with overactive bladder.  She reports compliance with oxybutynin for this concern, which is controlling her symptoms well.  She is in need of refill on this medication.     Chronic Kidney Disease, Stage 3a 11/26/2024    Patient with CKD stage 3 - GFR 30-59. The patient is not currently follow by Nephrology for this concern. Last renal function panel listed below with GFR of 51.2.  Patient was unaware of this diagnosis    Lab Results   Component Value Date    CREATININE 1.2 11/07/2024    BUN 19 11/07/2024     11/07/2024    K 4.3 11/07/2024     11/07/2024    CO2 27 11/07/2024          Migraine Headache     Patient with migraine headaches.  She reports compliance with Maxalt and topiramate for this concern.  She states that these medications are controlling her symptoms well.  She has been on multiple medications in the past which did not improve her symptoms.  She has also been assessed by Neurology in the past.  She is in need of refill both medications.                  ALLERGIES  Phenytoin sodium extended, Tramadol, and Valacyclovir     MEDICATIONS  Encounter Medications[1]     PAST MEDICAL HISTORY  Past Medical History:   Diagnosis Date    GERD (gastroesophageal reflux disease)     IBS (irritable bowel syndrome)     Migraine headache         PAST SURGIAL HISTORY  Past Surgical History:   Procedure Laterality Date    COLONOSCOPY  10/14/11    Dr Ch 2 polyps removed, 5 year recheck    COLONOSCOPY  11/09/2016    Dr Ch 5 year swati     DILATION AND CURETTAGE  OF UTERUS      TONSILLECTOMY      WISDOM TOOTH EXTRACTION          FAMILY HISTORY  Family History   Problem Relation Name Age of Onset    Hypertension Mother      Kidney disease Mother      Colon polyps Mother      Heart disease Father      Colon cancer Father      Cancer Father          colon/ leukemia     Hearing loss Father      Cataracts Father      Glaucoma Father      Vision loss Father      Kidney disease Father      Hypertension Sister      ADARSH disease Sister      Asthma Sister      Heart disease Sister      Amblyopia Daughter      Vitiligo Daughter      Depression Daughter      Hypertension Maternal Aunt      Stroke Maternal Aunt      Heart disease Maternal Uncle      Heart disease Maternal Grandmother      Dementia Maternal Grandmother      Alzheimer's disease Maternal Grandmother      Cancer Maternal Grandfather          lung    Diabetes Brother      No Known Problems Paternal Grandmother      Cancer Paternal Grandfather      Heart disease Paternal Uncle      Cancer Maternal Aunt          lung, smoker        PAST SOCIAL HISTORY  Social History     Socioeconomic History    Marital status:    Tobacco Use    Smoking status: Never    Smokeless tobacco: Never   Substance and Sexual Activity    Alcohol use: No    Drug use: No        Health Maintenance   Topic Date Due    Annual UACr  Never done    HIV Screening  Never done    COVID-19 Vaccine (5 - 2024-25 season) 09/01/2024    Mammogram  09/24/2025    Colorectal Cancer Screening  11/01/2026    Cervical Cancer Screening  08/07/2029    Lipid Panel  11/07/2029    TETANUS VACCINE  06/07/2032    Hepatitis C Screening  Completed    Shingles Vaccine  Completed    Influenza Vaccine  Completed    RSV Vaccine (Age 60+ and Pregnant patients)  Completed    Pneumococcal Vaccines (Age 50+)  Completed           ROS        Objective   Vitals:    04/08/25 1424   BP: 128/64   BP Location: Right arm   Patient Position: Sitting   Pulse: 86   Temp: 98.6 °F (37 °C)   TempSrc:  "Oral   SpO2: 97%   Weight: 62.6 kg (138 lb 0.1 oz)   Height: 5' 5" (1.651 m)       Body mass index is 22.97 kg/m².       Physical Exam  Constitutional:       General: She is not in acute distress.     Appearance: She is not ill-appearing.   HENT:      Head: Normocephalic and atraumatic.   Eyes:      Extraocular Movements: Extraocular movements intact.      Pupils: Pupils are equal, round, and reactive to light.   Cardiovascular:      Rate and Rhythm: Normal rate and regular rhythm.   Pulmonary:      Effort: Pulmonary effort is normal.      Breath sounds: Normal breath sounds. No wheezing, rhonchi or rales.   Musculoskeletal:         General: No swelling or tenderness. Normal range of motion.      Cervical back: Normal range of motion.   Skin:     General: Skin is warm and dry.   Neurological:      General: No focal deficit present.      Mental Status: She is alert and oriented to person, place, and time.   Psychiatric:         Mood and Affect: Mood normal.         Thought Content: Thought content normal.          Migraine without status migrainosus, not intractable, unspecified migraine type  Assessment & Plan:  Chronic problem stable per patient .  Continue Maxalt and topiramate, refills placed today.  Concern Nurtec if symptoms worsen, handout given    Orders:  -     rizatriptan (MAXALT) 10 MG tablet; TAKE 1 TABLET BY MOUTH AS NEEDED FOR MIGRAINE MAX 2 PER DAY  Dispense: 27 tablet; Refill: 3  -     topiramate (TOPAMAX) 25 MG tablet; Take 2 tablets (50 mg total) by mouth 2 (two) times daily.  Dispense: 360 tablet; Refill: 1    Overactive bladder  Assessment & Plan:  Chronic problem stable per patient .  Continue oxybutynin    Orders:  -     oxybutynin (DITROPAN-XL) 10 MG 24 hr tablet; Take 1 tablet (10 mg total) by mouth once daily.  Dispense: 90 tablet; Refill: 3    Chronic kidney disease, stage 3a  Assessment & Plan:  Chronic problem unsure of stability.  Will assess with updated blood work including CMP. "     Orders:  -     Renal Function Panel; Future; Expected date: 04/08/2025  -     Microalbumin/Creatinine Ratio, Urine; Future; Expected date: 04/08/2025    Chronic allergic rhinitis  Assessment & Plan:  Chronic problem stable per patient .  Continue Singulair and Flonase      Screening for HIV (human immunodeficiency virus)  -     HIV 1/2 Ag/Ab (4th Gen); Future; Expected date: 04/08/2025            Corby Gordillo             Portions of this note were created using voice recognition software. There may be voice recognition errors found in the text, and attempts were made to correct these errors prior to signature.        [1]   Outpatient Encounter Medications as of 4/8/2025   Medication Sig Dispense Refill    albuterol (PROVENTIL/VENTOLIN HFA) 90 mcg/actuation inhaler 2 puffs every 4 hours as needed for cough, wheeze, or shortness of breath 18 g 11    ALPRAZolam (XANAX) 0.25 MG tablet 1-2 up to three times a day as needed for anxiety flying 20 tablet 0    ascorbic Acid (VITAMIN C) 500 mg CpSR Take 500 mg by mouth once daily.      budesonide-formoterol 160-4.5 mcg (SYMBICORT) 160-4.5 mcg/actuation HFAA Inhale 2 puffs into the lungs every 12 (twelve) hours. Controller 30.6 g 3    cetirizine 10 mg Cap Take by mouth.      COLLAGEN MISC by Misc.(Non-Drug; Combo Route) route.      cyanocobalamin 500 MCG tablet Take 500 mcg by mouth once daily. Vitamin D      docusate sodium (COLACE) 100 MG capsule Take 100 mg by mouth daily as needed for Constipation.      fluticasone propionate (FLONASE) 50 mcg/actuation nasal spray INSTILL 2 SPRAYS IN EACH NOSTRIL ONCE A DAY 48 mL 3    metronidazole 0.75% (METROCREAM) 0.75 % Crea Apply topically 2 (two) times daily.      montelukast (SINGULAIR) 10 mg tablet Take 1 tablet (10 mg total) by mouth every evening. 30 tablet 11    multivitamin (THERAGRAN) per tablet Take 1 tablet by mouth once daily.       mv-mn/B.coag/B.subtilis/inulin (CULTURELLE PROBIOTIC-MULTIVIT ORAL) Take by mouth.       progesterone (PROMETRIUM) 200 MG capsule       spironolactone (ALDACTONE) 50 MG tablet Take 50 mg by mouth once daily.      sulfacetamide sodium-sulfur 10-5 % (w/w) Crea Apply topically once daily.      UNABLE TO FIND Pellets-every 4 months      vit C/E/Zn/coppr/lutein/zeaxan (PRESERVISION AREDS-2 ORAL) Take by mouth.      vitamin D (VITAMIN D3) 1000 units Tab Take 1,000 Units by mouth once daily.      [DISCONTINUED] oxybutynin (DITROPAN-XL) 10 MG 24 hr tablet Take 1 tablet (10 mg total) by mouth once daily. 90 tablet 3    [DISCONTINUED] rizatriptan (MAXALT) 10 MG tablet TAKE 1 TABLET BY MOUTH AS NEEDED FOR MIGRAINE MAX 2 PER DAY 27 tablet 3    [DISCONTINUED] topiramate (TOPAMAX) 25 MG tablet TAKE 2 TABLETS BY MOUTH TWICE A  tablet 1    oxybutynin (DITROPAN-XL) 10 MG 24 hr tablet Take 1 tablet (10 mg total) by mouth once daily. 90 tablet 3    rizatriptan (MAXALT) 10 MG tablet TAKE 1 TABLET BY MOUTH AS NEEDED FOR MIGRAINE MAX 2 PER DAY 27 tablet 3    topiramate (TOPAMAX) 25 MG tablet Take 2 tablets (50 mg total) by mouth 2 (two) times daily. 360 tablet 1    [DISCONTINUED] azithromycin (ZITHROMAX) 500 MG tablet One daily for yellow mucous, repeat if needed 3 tablet 3    [DISCONTINUED] hydrocortisone 2.5 % cream Apply topically 2 (two) times daily as needed.      [DISCONTINUED] mometasone-formoterol (DULERA) 200-5 mcg/actuation inhaler Inhale 2 puffs into the lungs 2 (two) times daily. Controller 13 g 11    [DISCONTINUED] omega-3 fatty acids/fish oil (FISH OIL-OMEGA-3 FATTY ACIDS) 300-1,000 mg capsule Take by mouth once daily.      [DISCONTINUED] predniSONE (DELTASONE) 20 MG tablet Take one daily for 3 days and may repeat for shortness of breath. 12 tablet 1    [DISCONTINUED] scopolamine (TRANSDERM-SCOP) 1.3-1.5 mg (1 mg over 3 days) Apply four hours before sailing, change every three days, remove four hours after final return to port (Patient not taking: Reported on 4/8/2025) 8 patch 1    [DISCONTINUED]  TURMERIC ORAL Take 1,000 mg by mouth.       No facility-administered encounter medications on file as of 4/8/2025.

## 2025-04-09 NOTE — ASSESSMENT & PLAN NOTE
Chronic problem stable per patient .  Continue Maxalt and topiramate, refills placed today.  Concern Nurtec if symptoms worsen, handout given

## 2025-04-14 ENCOUNTER — LAB VISIT (OUTPATIENT)
Dept: LAB | Facility: HOSPITAL | Age: 65
End: 2025-04-14
Attending: STUDENT IN AN ORGANIZED HEALTH CARE EDUCATION/TRAINING PROGRAM
Payer: COMMERCIAL

## 2025-04-14 DIAGNOSIS — Z11.4 SCREENING FOR HIV (HUMAN IMMUNODEFICIENCY VIRUS): ICD-10-CM

## 2025-04-14 DIAGNOSIS — N18.31 CHRONIC KIDNEY DISEASE, STAGE 3A: ICD-10-CM

## 2025-04-14 LAB
ALBUMIN SERPL BCP-MCNC: 3.9 G/DL (ref 3.5–5.2)
ALBUMIN/CREAT UR: ABNORMAL
ANION GAP (OHS): 10 MMOL/L (ref 8–16)
BUN SERPL-MCNC: 18 MG/DL (ref 8–23)
CALCIUM SERPL-MCNC: 9 MG/DL (ref 8.7–10.5)
CHLORIDE SERPL-SCNC: 108 MMOL/L (ref 95–110)
CO2 SERPL-SCNC: 21 MMOL/L (ref 23–29)
CREAT SERPL-MCNC: 1.1 MG/DL (ref 0.5–1.4)
CREAT UR-MCNC: 14 MG/DL (ref 15–325)
GFR SERPLBLD CREATININE-BSD FMLA CKD-EPI: 57 ML/MIN/1.73/M2
GLUCOSE SERPL-MCNC: 81 MG/DL (ref 70–110)
HIV 1+2 AB+HIV1 P24 AG SERPL QL IA: NORMAL
MICROALBUMIN UR-MCNC: <5 UG/ML (ref ?–5000)
PHOSPHATE SERPL-MCNC: 3 MG/DL (ref 2.7–4.5)
POTASSIUM SERPL-SCNC: 5.2 MMOL/L (ref 3.5–5.1)
SODIUM SERPL-SCNC: 139 MMOL/L (ref 136–145)

## 2025-04-14 PROCEDURE — 82043 UR ALBUMIN QUANTITATIVE: CPT

## 2025-04-14 PROCEDURE — 87389 HIV-1 AG W/HIV-1&-2 AB AG IA: CPT

## 2025-04-14 PROCEDURE — 36415 COLL VENOUS BLD VENIPUNCTURE: CPT | Mod: PN

## 2025-04-14 PROCEDURE — 82040 ASSAY OF SERUM ALBUMIN: CPT

## 2025-04-15 ENCOUNTER — RESULTS FOLLOW-UP (OUTPATIENT)
Dept: FAMILY MEDICINE | Facility: CLINIC | Age: 65
End: 2025-04-15

## 2025-04-15 DIAGNOSIS — E87.5 HYPERKALEMIA: Primary | ICD-10-CM

## 2025-04-22 ENCOUNTER — TELEPHONE (OUTPATIENT)
Dept: FAMILY MEDICINE | Facility: CLINIC | Age: 65
End: 2025-04-22
Payer: COMMERCIAL

## 2025-04-22 NOTE — TELEPHONE ENCOUNTER
----- Message from Corby Gordillo MD sent at 4/22/2025  8:19 AM CDT -----  Regarding: RE: hair loss  Contact: pt  I wouldn't think that this is related to kidney function.  I would suggest an office visit to discuss in detail.  ----- Message -----  From: Boeckelman, Jeanne, LPN  Sent: 4/17/2025   2:49 PM CDT  To: Corby Gordillo MD  Subject: hair loss                                          ----- Message -----  From: Key Coates  Sent: 4/17/2025   2:12 PM CDT  To: D[facility] Tavarez Staff    Type:  Needs Medical AdviceWho Called: ptWould the patient rather a call back or a response via [facility]? Call [facility] Call Back Number: [phone] Information: pt is losing hair and wants to know if that is part of the current kidney issuePlease call to adviseThdonovan

## 2025-04-22 NOTE — TELEPHONE ENCOUNTER
----- Message from Fay sent at 4/22/2025  9:48 AM CDT -----  Pt would like to come in tomorrow at 8/815 am for labs. Pt has a meeting at 9am which was her scheduled lab appointment.[phone]

## 2025-04-22 NOTE — TELEPHONE ENCOUNTER
I wouldn't think that this is related to kidney function.  I would suggest an office visit to discuss in detail.

## 2025-04-23 ENCOUNTER — LAB VISIT (OUTPATIENT)
Dept: LAB | Facility: HOSPITAL | Age: 65
End: 2025-04-23
Attending: STUDENT IN AN ORGANIZED HEALTH CARE EDUCATION/TRAINING PROGRAM
Payer: COMMERCIAL

## 2025-04-23 DIAGNOSIS — E87.5 HYPERKALEMIA: ICD-10-CM

## 2025-04-23 LAB
ALBUMIN SERPL BCP-MCNC: 3.9 G/DL (ref 3.5–5.2)
ANION GAP (OHS): 6 MMOL/L (ref 8–16)
BUN SERPL-MCNC: 19 MG/DL (ref 8–23)
CALCIUM SERPL-MCNC: 9.3 MG/DL (ref 8.7–10.5)
CHLORIDE SERPL-SCNC: 111 MMOL/L (ref 95–110)
CO2 SERPL-SCNC: 23 MMOL/L (ref 23–29)
CREAT SERPL-MCNC: 1.2 MG/DL (ref 0.5–1.4)
GFR SERPLBLD CREATININE-BSD FMLA CKD-EPI: 51 ML/MIN/1.73/M2
GLUCOSE SERPL-MCNC: 84 MG/DL (ref 70–110)
PHOSPHATE SERPL-MCNC: 2.6 MG/DL (ref 2.7–4.5)
POTASSIUM SERPL-SCNC: 5.5 MMOL/L (ref 3.5–5.1)
SODIUM SERPL-SCNC: 140 MMOL/L (ref 136–145)

## 2025-04-23 PROCEDURE — 80069 RENAL FUNCTION PANEL: CPT

## 2025-04-23 PROCEDURE — 36415 COLL VENOUS BLD VENIPUNCTURE: CPT | Mod: PN

## 2025-04-24 ENCOUNTER — RESULTS FOLLOW-UP (OUTPATIENT)
Dept: FAMILY MEDICINE | Facility: CLINIC | Age: 65
End: 2025-04-24

## 2025-04-29 ENCOUNTER — OFFICE VISIT (OUTPATIENT)
Dept: FAMILY MEDICINE | Facility: CLINIC | Age: 65
End: 2025-04-29
Payer: COMMERCIAL

## 2025-04-29 VITALS
HEIGHT: 65 IN | TEMPERATURE: 98 F | OXYGEN SATURATION: 97 % | HEART RATE: 79 BPM | SYSTOLIC BLOOD PRESSURE: 104 MMHG | WEIGHT: 138.25 LBS | DIASTOLIC BLOOD PRESSURE: 60 MMHG | BODY MASS INDEX: 23.03 KG/M2

## 2025-04-29 DIAGNOSIS — L65.9 HAIR LOSS: ICD-10-CM

## 2025-04-29 DIAGNOSIS — N18.31 CHRONIC KIDNEY DISEASE, STAGE 3A: Primary | ICD-10-CM

## 2025-04-29 DIAGNOSIS — E87.5 HYPERKALEMIA: ICD-10-CM

## 2025-04-29 PROCEDURE — 3008F BODY MASS INDEX DOCD: CPT | Mod: CPTII,S$GLB,, | Performed by: STUDENT IN AN ORGANIZED HEALTH CARE EDUCATION/TRAINING PROGRAM

## 2025-04-29 PROCEDURE — 1159F MED LIST DOCD IN RCRD: CPT | Mod: CPTII,S$GLB,, | Performed by: STUDENT IN AN ORGANIZED HEALTH CARE EDUCATION/TRAINING PROGRAM

## 2025-04-29 PROCEDURE — 99999 PR PBB SHADOW E&M-EST. PATIENT-LVL IV: CPT | Mod: PBBFAC,,, | Performed by: STUDENT IN AN ORGANIZED HEALTH CARE EDUCATION/TRAINING PROGRAM

## 2025-04-29 PROCEDURE — 3074F SYST BP LT 130 MM HG: CPT | Mod: CPTII,S$GLB,, | Performed by: STUDENT IN AN ORGANIZED HEALTH CARE EDUCATION/TRAINING PROGRAM

## 2025-04-29 PROCEDURE — 3078F DIAST BP <80 MM HG: CPT | Mod: CPTII,S$GLB,, | Performed by: STUDENT IN AN ORGANIZED HEALTH CARE EDUCATION/TRAINING PROGRAM

## 2025-04-29 PROCEDURE — 1160F RVW MEDS BY RX/DR IN RCRD: CPT | Mod: CPTII,S$GLB,, | Performed by: STUDENT IN AN ORGANIZED HEALTH CARE EDUCATION/TRAINING PROGRAM

## 2025-04-29 PROCEDURE — 99214 OFFICE O/P EST MOD 30 MIN: CPT | Mod: S$GLB,,, | Performed by: STUDENT IN AN ORGANIZED HEALTH CARE EDUCATION/TRAINING PROGRAM

## 2025-04-29 NOTE — PROGRESS NOTES
Subjective      Joselyn Hurd is a 64 y.o. female        Chief Complaint   Patient presents with    Hair Loss        HPI     Patient presents today for Chronic Care Management follow up.     Patient also with concerns about hyperkalemia and hair loss.      In terms of her hyperkalemia, patient has recently underwent blood work.  She has also been holding her spironolactone, which may have been causing her hyperkalemia.    In terms of her hair loss, she reports that this has been going on for several months.  She has noted increased hair in her brushes.  Her hairdresser was also made mention during a recent haircut.        Problem Noted   Hair Loss 4/29/2025   Chronic Allergic Rhinitis 4/9/2025    Patient with chronic allergic rhinitis.  She reports compliance with Flonase and Singulair for this concern, which is controlling her symptoms well.     Overactive Bladder 4/9/2025    Patient with overactive bladder.  She reports compliance with oxybutynin for this concern, which is controlling her symptoms well.  She is in need of refill on this medication.     Chronic Kidney Disease, Stage 3a 11/26/2024    Patient with CKD stage 3 - GFR 30-59. The patient is not currently follow by Nephrology for this concern. Last renal function panel listed below with GFR of 51.2.  Patient was unaware of this diagnosis.  She has been using spironolactone at the direction her Ob/gyn for abnormal hair growth    Lab Results   Component Value Date    CREATININE 1.2 04/23/2025    BUN 19 04/23/2025     04/23/2025    K 5.5 (H) 04/23/2025     (H) 04/23/2025    CO2 23 04/23/2025          Migraine Headache     Patient with migraine headaches.  She reports compliance with Maxalt and topiramate for this concern.  She states that these medications are controlling her symptoms well.  She has been on multiple medications in the past which did not improve her symptoms.  She has also been assessed by Neurology in the past.  She is in need  of refill both medications.                  ALLERGIES  Phenytoin sodium extended, Tramadol, and Valacyclovir     MEDICATIONS  Encounter Medications[1]     PAST MEDICAL HISTORY  Past Medical History:   Diagnosis Date    GERD (gastroesophageal reflux disease)     IBS (irritable bowel syndrome)     Migraine headache         PAST SURGIAL HISTORY  Past Surgical History:   Procedure Laterality Date    COLONOSCOPY  10/14/11    Dr hC 2 polyps removed, 5 year recheck    COLONOSCOPY  11/09/2016    Dr Ch 5 year swati     DILATION AND CURETTAGE OF UTERUS      TONSILLECTOMY      WISDOM TOOTH EXTRACTION          FAMILY HISTORY  Family History   Problem Relation Name Age of Onset    Hypertension Mother      Kidney disease Mother      Colon polyps Mother      Heart disease Father      Colon cancer Father      Cancer Father          colon/ leukemia     Hearing loss Father      Cataracts Father      Glaucoma Father      Vision loss Father      Kidney disease Father      Hypertension Sister      ADARSH disease Sister      Asthma Sister      Heart disease Sister      Amblyopia Daughter      Vitiligo Daughter      Depression Daughter      Hypertension Maternal Aunt      Stroke Maternal Aunt      Heart disease Maternal Uncle      Heart disease Maternal Grandmother      Dementia Maternal Grandmother      Alzheimer's disease Maternal Grandmother      Cancer Maternal Grandfather          lung    Diabetes Brother      No Known Problems Paternal Grandmother      Cancer Paternal Grandfather      Heart disease Paternal Uncle      Cancer Maternal Aunt          lung, smoker        PAST SOCIAL HISTORY  Social History     Socioeconomic History    Marital status:    Tobacco Use    Smoking status: Never    Smokeless tobacco: Never   Substance and Sexual Activity    Alcohol use: No    Drug use: No        Health Maintenance   Topic Date Due    COVID-19 Vaccine (5 - 2024-25 season) 09/01/2024    Mammogram  09/24/2025    Annual UACr   "04/14/2026    Colorectal Cancer Screening  11/01/2026    Cervical Cancer Screening  08/07/2029    Lipid Panel  11/07/2029    TETANUS VACCINE  06/07/2032    Hepatitis C Screening  Completed    Shingles Vaccine  Completed    Influenza Vaccine  Completed    HIV Screening  Completed    RSV Vaccine (Age 60+ and Pregnant patients)  Completed    Pneumococcal Vaccines (Age 50+)  Completed           ROS        Objective   Vitals:    04/29/25 1116   BP: 104/60   BP Location: Left arm   Patient Position: Sitting   Pulse: 79   Temp: 98.2 °F (36.8 °C)   TempSrc: Oral   SpO2: 97%   Weight: 62.7 kg (138 lb 3.7 oz)   Height: 5' 5" (1.651 m)       Body mass index is 23 kg/m².       Physical Exam  Constitutional:       General: She is not in acute distress.     Appearance: She is not ill-appearing.   HENT:      Head: Normocephalic and atraumatic.   Eyes:      Extraocular Movements: Extraocular movements intact.      Pupils: Pupils are equal, round, and reactive to light.   Cardiovascular:      Rate and Rhythm: Normal rate and regular rhythm.   Pulmonary:      Effort: Pulmonary effort is normal.      Breath sounds: Normal breath sounds. No wheezing, rhonchi or rales.   Musculoskeletal:         General: No swelling or tenderness. Normal range of motion.      Cervical back: Normal range of motion.   Skin:     General: Skin is warm and dry.   Neurological:      General: No focal deficit present.      Mental Status: She is alert and oriented to person, place, and time.   Psychiatric:         Mood and Affect: Mood normal.         Thought Content: Thought content normal.          Chronic kidney disease, stage 3a  Assessment & Plan:  Chronic problem.  Not well controlled based on last renal function panel, we will repeat.    Orders:  -     Renal Function Panel; Future; Expected date: 04/29/2025    Hyperkalemia  Comments:  acute problem. noted on last 2 renal function panel, we will repeat.  Likely related to spironolactone use versus " CKD  Orders:  -     Renal Function Panel; Future; Expected date: 04/29/2025    Hair loss  Comments:  Chronic problem with progression.  Not well controlled per patient.  We will assess with updated blood work including CBC and TSH  Orders:  -     TSH; Future; Expected date: 04/29/2025  -     CBC Auto Differential; Future; Expected date: 04/29/2025            Corby Ferreramelia             Portions of this note were created using voice recognition software. There may be voice recognition errors found in the text, and attempts were made to correct these errors prior to signature.        [1]   Outpatient Encounter Medications as of 4/29/2025   Medication Sig Dispense Refill    albuterol (PROVENTIL/VENTOLIN HFA) 90 mcg/actuation inhaler 2 puffs every 4 hours as needed for cough, wheeze, or shortness of breath 18 g 11    ALPRAZolam (XANAX) 0.25 MG tablet 1-2 up to three times a day as needed for anxiety flying 20 tablet 0    ascorbic Acid (VITAMIN C) 500 mg CpSR Take 500 mg by mouth once daily.      budesonide-formoterol 160-4.5 mcg (SYMBICORT) 160-4.5 mcg/actuation HFAA Inhale 2 puffs into the lungs every 12 (twelve) hours. Controller 30.6 g 3    cetirizine 10 mg Cap Take by mouth.      COLLAGEN MISC by Misc.(Non-Drug; Combo Route) route.      cyanocobalamin 500 MCG tablet Take 500 mcg by mouth once daily. Vitamin D      docusate sodium (COLACE) 100 MG capsule Take 100 mg by mouth daily as needed for Constipation.      fluticasone propionate (FLONASE) 50 mcg/actuation nasal spray INSTILL 2 SPRAYS IN EACH NOSTRIL ONCE A DAY 48 mL 3    metronidazole 0.75% (METROCREAM) 0.75 % Crea Apply topically 2 (two) times daily.      montelukast (SINGULAIR) 10 mg tablet Take 1 tablet (10 mg total) by mouth every evening. 30 tablet 11    multivitamin (THERAGRAN) per tablet Take 1 tablet by mouth once daily.       mv-mn/B.coag/B.subtilis/inulin (CULTURELLE PROBIOTIC-MULTIVIT ORAL) Take by mouth.      oxybutynin (DITROPAN-XL) 10 MG 24 hr  tablet Take 1 tablet (10 mg total) by mouth once daily. 90 tablet 3    progesterone (PROMETRIUM) 200 MG capsule       rizatriptan (MAXALT) 10 MG tablet TAKE 1 TABLET BY MOUTH AS NEEDED FOR MIGRAINE MAX 2 PER DAY 27 tablet 3    spironolactone (ALDACTONE) 50 MG tablet Take 50 mg by mouth once daily.      sulfacetamide sodium-sulfur 10-5 % (w/w) Crea Apply topically once daily.      topiramate (TOPAMAX) 25 MG tablet Take 2 tablets (50 mg total) by mouth 2 (two) times daily. 360 tablet 1    UNABLE TO FIND Pellets-every 4 months      vit C/E/Zn/coppr/lutein/zeaxan (PRESERVISION AREDS-2 ORAL) Take by mouth.      vitamin D (VITAMIN D3) 1000 units Tab Take 1,000 Units by mouth once daily.       No facility-administered encounter medications on file as of 4/29/2025.      Physical Exam    Vital Signs: I have reviewed the initial vital signs  Constitutional: well-nourished, appears stated age, no acute distress  EENT: Conjunctiva pink, Sclera clear, EOMI. Mucous membranes moist, no exudates or lesions noted, uvula midline. Non-tender lymph nodes  Cardiovascular: S1 and S2 present, regular rate, regular rhythm  Respiratory: unlabored respiratory effort, clear to auscultation bilaterally no wheezing, rales and rhonchi  Musculoskeletal: supple nontender neck, no midline tenderness, no joint pain  Integumentary: warm, dry, no rash  Psychiatric: appropriate mood, appropriate affect

## 2025-05-05 DIAGNOSIS — J45.991 COUGH VARIANT ASTHMA: ICD-10-CM

## 2025-05-05 RX ORDER — BUDESONIDE AND FORMOTEROL FUMARATE DIHYDRATE 160; 4.5 UG/1; UG/1
2 AEROSOL RESPIRATORY (INHALATION) EVERY 12 HOURS
Qty: 30.6 G | Refills: 3 | Status: SHIPPED | OUTPATIENT
Start: 2025-05-05 | End: 2026-05-05

## 2025-05-06 ENCOUNTER — LAB VISIT (OUTPATIENT)
Dept: LAB | Facility: HOSPITAL | Age: 65
End: 2025-05-06
Attending: STUDENT IN AN ORGANIZED HEALTH CARE EDUCATION/TRAINING PROGRAM
Payer: COMMERCIAL

## 2025-05-06 DIAGNOSIS — L65.9 HAIR LOSS: ICD-10-CM

## 2025-05-06 DIAGNOSIS — N18.31 CHRONIC KIDNEY DISEASE, STAGE 3A: ICD-10-CM

## 2025-05-06 DIAGNOSIS — E87.5 HYPERKALEMIA: ICD-10-CM

## 2025-05-06 LAB
ABSOLUTE EOSINOPHIL (OHS): 0.2 K/UL
ABSOLUTE MONOCYTE (OHS): 0.52 K/UL (ref 0.3–1)
ABSOLUTE NEUTROPHIL COUNT (OHS): 3.58 K/UL (ref 1.8–7.7)
ALBUMIN SERPL BCP-MCNC: 4.4 G/DL (ref 3.5–5.2)
ANION GAP (OHS): 7 MMOL/L (ref 8–16)
BASOPHILS # BLD AUTO: 0.05 K/UL
BASOPHILS NFR BLD AUTO: 0.8 %
BUN SERPL-MCNC: 24 MG/DL (ref 8–23)
CALCIUM SERPL-MCNC: 9.9 MG/DL (ref 8.7–10.5)
CHLORIDE SERPL-SCNC: 108 MMOL/L (ref 95–110)
CO2 SERPL-SCNC: 22 MMOL/L (ref 23–29)
CREAT SERPL-MCNC: 1.3 MG/DL (ref 0.5–1.4)
ERYTHROCYTE [DISTWIDTH] IN BLOOD BY AUTOMATED COUNT: 12.7 % (ref 11.5–14.5)
GFR SERPLBLD CREATININE-BSD FMLA CKD-EPI: 46 ML/MIN/1.73/M2
GLUCOSE SERPL-MCNC: 79 MG/DL (ref 70–110)
HCT VFR BLD AUTO: 42.2 % (ref 37–48.5)
HGB BLD-MCNC: 13.4 GM/DL (ref 12–16)
IMM GRANULOCYTES # BLD AUTO: 0.02 K/UL (ref 0–0.04)
IMM GRANULOCYTES NFR BLD AUTO: 0.3 % (ref 0–0.5)
LYMPHOCYTES # BLD AUTO: 1.68 K/UL (ref 1–4.8)
MCH RBC QN AUTO: 29.1 PG (ref 27–31)
MCHC RBC AUTO-ENTMCNC: 31.8 G/DL (ref 32–36)
MCV RBC AUTO: 92 FL (ref 82–98)
NUCLEATED RBC (/100WBC) (OHS): 0 /100 WBC
PHOSPHATE SERPL-MCNC: 2.8 MG/DL (ref 2.7–4.5)
PLATELET # BLD AUTO: 319 K/UL (ref 150–450)
PMV BLD AUTO: 10.3 FL (ref 9.2–12.9)
POTASSIUM SERPL-SCNC: 3.9 MMOL/L (ref 3.5–5.1)
RBC # BLD AUTO: 4.61 M/UL (ref 4–5.4)
RELATIVE EOSINOPHIL (OHS): 3.3 %
RELATIVE LYMPHOCYTE (OHS): 27.8 % (ref 18–48)
RELATIVE MONOCYTE (OHS): 8.6 % (ref 4–15)
RELATIVE NEUTROPHIL (OHS): 59.2 % (ref 38–73)
SODIUM SERPL-SCNC: 137 MMOL/L (ref 136–145)
TSH SERPL-ACNC: 2.85 UIU/ML (ref 0.4–4)
WBC # BLD AUTO: 6.05 K/UL (ref 3.9–12.7)

## 2025-05-06 PROCEDURE — 85025 COMPLETE CBC W/AUTO DIFF WBC: CPT

## 2025-05-06 PROCEDURE — 80069 RENAL FUNCTION PANEL: CPT

## 2025-05-06 PROCEDURE — 36415 COLL VENOUS BLD VENIPUNCTURE: CPT | Mod: PN

## 2025-05-06 PROCEDURE — 84443 ASSAY THYROID STIM HORMONE: CPT

## 2025-05-07 ENCOUNTER — RESULTS FOLLOW-UP (OUTPATIENT)
Dept: FAMILY MEDICINE | Facility: CLINIC | Age: 65
End: 2025-05-07

## 2025-07-01 ENCOUNTER — OFFICE VISIT (OUTPATIENT)
Dept: PULMONOLOGY | Facility: CLINIC | Age: 65
End: 2025-07-01
Payer: COMMERCIAL

## 2025-07-01 VITALS
HEIGHT: 65 IN | SYSTOLIC BLOOD PRESSURE: 102 MMHG | BODY MASS INDEX: 22.8 KG/M2 | WEIGHT: 136.81 LBS | OXYGEN SATURATION: 98 % | HEART RATE: 89 BPM | DIASTOLIC BLOOD PRESSURE: 59 MMHG

## 2025-07-01 DIAGNOSIS — J45.991 COUGH VARIANT ASTHMA: ICD-10-CM

## 2025-07-01 DIAGNOSIS — N18.31 STAGE 3A CHRONIC KIDNEY DISEASE: Primary | ICD-10-CM

## 2025-07-01 DIAGNOSIS — J45.990 EXERCISE-INDUCED ASTHMA: ICD-10-CM

## 2025-07-01 DIAGNOSIS — J45.30 MILD PERSISTENT ASTHMA WITHOUT COMPLICATION: ICD-10-CM

## 2025-07-01 DIAGNOSIS — R05.3 CHRONIC COUGH: ICD-10-CM

## 2025-07-01 PROCEDURE — 3008F BODY MASS INDEX DOCD: CPT | Mod: CPTII,S$GLB,, | Performed by: INTERNAL MEDICINE

## 2025-07-01 PROCEDURE — 3078F DIAST BP <80 MM HG: CPT | Mod: CPTII,S$GLB,, | Performed by: INTERNAL MEDICINE

## 2025-07-01 PROCEDURE — 1159F MED LIST DOCD IN RCRD: CPT | Mod: CPTII,S$GLB,, | Performed by: INTERNAL MEDICINE

## 2025-07-01 PROCEDURE — 99213 OFFICE O/P EST LOW 20 MIN: CPT | Mod: S$GLB,,, | Performed by: INTERNAL MEDICINE

## 2025-07-01 PROCEDURE — 3074F SYST BP LT 130 MM HG: CPT | Mod: CPTII,S$GLB,, | Performed by: INTERNAL MEDICINE

## 2025-07-01 PROCEDURE — 99999 PR PBB SHADOW E&M-EST. PATIENT-LVL IV: CPT | Mod: PBBFAC,,, | Performed by: INTERNAL MEDICINE

## 2025-07-01 RX ORDER — AZITHROMYCIN 500 MG/1
TABLET, FILM COATED ORAL
Qty: 3 TABLET | Refills: 3 | Status: SHIPPED | OUTPATIENT
Start: 2025-07-01

## 2025-07-01 RX ORDER — ALBUTEROL SULFATE 90 UG/1
INHALANT RESPIRATORY (INHALATION)
Qty: 18 G | Refills: 11 | Status: SHIPPED | OUTPATIENT
Start: 2025-07-01

## 2025-07-01 RX ORDER — PREDNISONE 20 MG/1
TABLET ORAL
Qty: 21 TABLET | Refills: 0 | Status: SHIPPED | OUTPATIENT
Start: 2025-07-01

## 2025-07-01 NOTE — PATIENT INSTRUCTIONS
On good controller - symbicort 2 puffs once daily-- go to twice daily if asthma active    Use rescue with albuterol...  would use airsupra (if covered) for rescue as may avoid need for prednisone with exacerbations      Use azithromycin if sinus infections    You are stable     Would recommend getting asthma meds from pcp- follow up if any issues    Would follow up blood test prior to follow up with pcp...   ct abd 2017 had good looking kidneys..

## 2025-07-01 NOTE — PROGRESS NOTES
7/1/2025    Joselyn Hurd  New Patient Consult    Chief Complaint   Patient presents with    Follow-up    Cough    Asthma       HPI:    7/1/2025 pt uses symbicort 2 once daily, no albuterol use, was on aldactone for hormonal manipulation and developed stage 3 renal     10/2/2023  uses dulera daily in am, has occ wheezes but controlled cough.  Sinuses controlled. Uses singulair.     Uses dulera 2 daily and occ wheeze but no  cough...   Patient Instructions   Chronic recurrent sinusitis -- singulair and other sprays may be controlling.  Taper since stable to minimal regimen would be fine      Persistent cough asthma-- use dulera 1-2 twice daily but once daily ok for controller, use albuterol for rescue (not use recently), and use action plan (prednisone if only cough/wheeze -- azithromycin for yellow mucous -- ok to use both)    With sinus infections twice yearly -- would screen immune system-- blood test -- notify results over phone.        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  Cough started 9/2022 --  No sinus problem, coughed violently evenings through morning, had min wheezes.  Pt will have some gibson.     Pt had temp to 100.6 with visit Dr Jaffe 10/6/2022 -- no problem visit with Dr Eason 9/2022.   Rx albuterol and  tessalon and doxycyline.  And tussionex-- no help.    No prior history.    Pt lingered cough as above - sinuses felt ok -- uses flonase and allergy pill nicol with good control    Pt seen by Dr Thacker 2 wks ago --    Took prednisone 5 days, cough better and more than prior.    Pt has sinus infections 1-2 yrly.  Uses zpaks.  Has yg mucous with no fever  Patient Instructions   You may have had prior sinus infections - uses antibiotics twice yearly- ct head 2015 might have had some ethmoid sinusitis?  You had fever and headache as part of initial presentation cough.    Use azithromycin as needed for infection sinus/bronchi.    Continue flonase and allergy pill daily. Add singulair -- helps  sinus and asthma but not too potent?  Sijngulair may cause headaches -- not migranes.     Use controller, wixela, til out - try once daily -- may need to stay on controller.  Has 12 hr bronchial med to stabilize airway and inhaled steroids to control asthma--- will give paper script for dulera for 2 puffs up to twice daily -- has fast acting bronchial medication and inhaled steroid.       Would use albuterol prior to exercise and for any lung symptoms-- rescue-- use 1-2-4 puffs up to every 4 hours as needed.      Cxr viewed and good.    Use prednisone (action plan) for exacerbation -- should follow by controller---- dulera/wixela and/or singulair.    If persistent -- may be reasonable to do ct sinus and immune testing -- seems low yield now.    If asthma persistent in future-- would do breathing testing.      PFSH:  Past Medical History:   Diagnosis Date    GERD (gastroesophageal reflux disease)     IBS (irritable bowel syndrome)     Migraine headache          Past Surgical History:   Procedure Laterality Date    COLONOSCOPY  10/14/11    Dr Ch 2 polyps removed, 5 year recheck    COLONOSCOPY  11/09/2016    Dr Ch 5 year swati     DILATION AND CURETTAGE OF UTERUS      TONSILLECTOMY      WISDOM TOOTH EXTRACTION       Social History     Tobacco Use    Smoking status: Never    Smokeless tobacco: Never   Substance Use Topics    Alcohol use: No    Drug use: No     Family History   Problem Relation Name Age of Onset    Hypertension Mother      Kidney disease Mother      Colon polyps Mother      Heart disease Father      Colon cancer Father      Cancer Father          colon/ leukemia     Hearing loss Father      Cataracts Father      Glaucoma Father      Vision loss Father      Kidney disease Father      Hypertension Sister      ADARSH disease Sister      Asthma Sister      Heart disease Sister      Amblyopia Daughter      Vitiligo Daughter      Depression Daughter      Hypertension Maternal Aunt      Stroke Maternal  "Aunt      Heart disease Maternal Uncle      Heart disease Maternal Grandmother      Dementia Maternal Grandmother      Alzheimer's disease Maternal Grandmother      Cancer Maternal Grandfather          lung    Diabetes Brother      No Known Problems Paternal Grandmother      Cancer Paternal Grandfather      Heart disease Paternal Uncle      Cancer Maternal Aunt          lung, smoker     Review of patient's allergies indicates:   Allergen Reactions    Phenytoin sodium extended      Other reaction(s): tachycardia    Tramadol Hives    Valacyclovir      Other reaction(s): tachycardia          Review of Systems:  a review of eleven systems covering constitutional, Eye, HEENT, Psych, Respiratory, Cardiac, GI, , Musculoskeletal, Endocrine, Dermatologic was negative except for pertinent findings as listed ABOVE and below:  pertinent positives as above, rest good        Exam:Comprehensive exam done. BP (!) 102/59 (BP Location: Right arm, Patient Position: Sitting)   Pulse 89   Ht 5' 5" (1.651 m)   Wt 62.1 kg (136 lb 12.7 oz)   SpO2 98% Comment: on room air at rest  BMI 22.76 kg/m²   Exam included Vitals as listed, and patient's appearance and affect and alertness and mood, oral exam for yeast and hygiene and pharynx lesions and Mallapatti (M) score, neck with inspection for jvd and masses and thyroid abnormalities and lymph nodes (supraclavicular and infraclavicular nodes and axillary also examined and noted if abn), chest exam included symmetry and effort and fremitus and percussion and auscultation, cardiac exam included rhythm and gallops and murmur and rubs and jvd and edema, abdominal exam for mass and hepatosplenomegaly and tenderness and hernias and bowel sounds, Musculoskeletal exam with muscle tone and posture and mobility/gait and  strength, and skin for rashes and cyanosis and pallor and turgor, extremity for clubbing.  Findings were normal except for pertinent findings listed below:  M1, chest is " symmetric, no distress, normal percussion, normal fremitus and good normal breath sounds  No edeam         Radiographs (ct chest and cxr) reviewed: view by direct vision   cxr 4/2023 nl    Labs reviewed   low proteins and min eos        PFT results reviewed  4/28/2023 pft [facility] nl cecile, bd, lung vol, and dlco      Plan:  Clinical impression is apparently straight forward and impression with management as below.    Joselyn was seen today for follow-up, cough and asthma.    Diagnoses and all orders for this visit:    Stage 3a chronic kidney disease  -     Basic Metabolic Panel; Future    Mild persistent asthma without complication  -     albuterol (PROVENTIL/VENTOLIN HFA) 90 mcg/actuation inhaler; 2 puffs every 4 hours as needed for cough, wheeze, or shortness of breath  -     predniSONE (DELTASONE) 20 MG tablet; Take one daily for 3 days and may repeat for shortness of breath.  -     azithromycin (ZITHROMAX) 500 MG tablet; One daily for yellow mucous, repeat if needed  -     albuterol-budesonide (AIRSUPRA) 90-80 mcg/actuation; Inhale 2 puffs into the lungs every 4 (four) hours as needed.    Chronic cough  -     albuterol (PROVENTIL/VENTOLIN HFA) 90 mcg/actuation inhaler; 2 puffs every 4 hours as needed for cough, wheeze, or shortness of breath    Cough variant asthma  -     albuterol (PROVENTIL/VENTOLIN HFA) 90 mcg/actuation inhaler; 2 puffs every 4 hours as needed for cough, wheeze, or shortness of breath    Exercise-induced asthma  -     albuterol (PROVENTIL/VENTOLIN HFA) 90 mcg/actuation inhaler; 2 puffs every 4 hours as needed for cough, wheeze, or shortness of breath            Follow up if symptoms worsen or fail to improve.    Discussed with patient above for education the following:      Patient Instructions   On good controller - symbicort 2 puffs once daily-- go to twice daily if asthma active    Use rescue with albuterol...  would use airsupra (if covered) for rescue as may avoid need for prednisone with  exacerbations      Use azithromycin if sinus infections    You are stable     Would recommend getting asthma meds from pcp- follow up if any issues    Would follow up blood test prior to follow up with pcp...   ct abd 2017 had good looking kidneys..   Eval took 26 min

## 2025-07-21 DIAGNOSIS — J45.30 MILD PERSISTENT ASTHMA WITHOUT COMPLICATION: ICD-10-CM

## 2025-07-21 RX ORDER — PREDNISONE 20 MG/1
TABLET ORAL
Qty: 21 TABLET | Refills: 0 | Status: SHIPPED | OUTPATIENT
Start: 2025-07-21

## 2025-08-23 DIAGNOSIS — G43.909 MIGRAINE WITHOUT STATUS MIGRAINOSUS, NOT INTRACTABLE, UNSPECIFIED MIGRAINE TYPE: ICD-10-CM

## 2025-08-24 RX ORDER — RIZATRIPTAN BENZOATE 10 MG/1
TABLET ORAL
Qty: 27 TABLET | Refills: 2 | Status: SHIPPED | OUTPATIENT
Start: 2025-08-24